# Patient Record
Sex: MALE | Race: WHITE | NOT HISPANIC OR LATINO | Employment: FULL TIME | URBAN - METROPOLITAN AREA
[De-identification: names, ages, dates, MRNs, and addresses within clinical notes are randomized per-mention and may not be internally consistent; named-entity substitution may affect disease eponyms.]

---

## 2019-09-26 ENCOUNTER — OFFICE VISIT (OUTPATIENT)
Dept: SLEEP CENTER | Facility: CLINIC | Age: 49
End: 2019-09-26
Payer: COMMERCIAL

## 2019-09-26 VITALS
HEIGHT: 73 IN | DIASTOLIC BLOOD PRESSURE: 78 MMHG | SYSTOLIC BLOOD PRESSURE: 122 MMHG | WEIGHT: 237 LBS | BODY MASS INDEX: 31.41 KG/M2 | HEART RATE: 72 BPM

## 2019-09-26 DIAGNOSIS — G47.33 OSA (OBSTRUCTIVE SLEEP APNEA): Primary | ICD-10-CM

## 2019-09-26 PROCEDURE — 99204 OFFICE O/P NEW MOD 45 MIN: CPT | Performed by: PSYCHIATRY & NEUROLOGY

## 2019-09-26 RX ORDER — DIPHENOXYLATE HYDROCHLORIDE AND ATROPINE SULFATE 2.5; .025 MG/1; MG/1
1 TABLET ORAL DAILY
COMMUNITY

## 2019-09-26 NOTE — LETTER
September 26, 2019     Eleuterio Woods MD  [address] [place] [id number]    Patient: Chino Baxter   YOB: 1970   Date of Visit: 9/26/2019       Dear Dr Anguiano Rolling:    Thank you for referring Chino Baxter to me for evaluation  Below are my notes for this consultation  If you have questions, please do not hesitate to call me  I look forward to following your patient along with you  Sincerely,        Kami Polanco MD        CC: No Recipients  Kami Polanco MD  9/26/2019 10:15 AM  Sign at close encounter  Sleep Medicine Consultation Note    HPI:  Mr Chino Baxter is a 47 y o  male seen at the request of Eleuterio MILLAN for advice regarding suspected sleep disordered breathing  The patient stated that he was diagnosed in 2000 in [place]  He was started on CPAP and eventually got onto BiPAP  He has been on BiPAP 17/14cm most of the time  He moved to this area 1 year ago and is looking for new supplies  His machine is 33 years old  He uses a nasal mask  His DME was [employer] then it was changed to [employer] [facility] [place]  He is unsure if they are still in business as he cannot get a hold of them  He feels that the BiPAP is working, to an extent  He cannot sleep without it  He wakes up twice at night to void  He has no issue falling asleep  He denied snoring with the BiPAP  He denied nocturnal gasping and witnessed apneas  The mask will wake him when the seal is not great  During the day, he is usually okay until 3pm  He is able to get up ready for the day  He does not usually nap  He denied involuntary dozing  He has had sleepiness while driving and usually fights it until it becomes too much and then pulls over to nap for 15 mins  He denied any close calls or sleepiness       Please see below for continuation of the HPI:      Sleep Disordered Breathing:  -Snoring: yes   -Severity: moderate   -Frequency: every night in the past  Not currently   -Duration: a few years   -Over time: improved and resolved with PAP   -Modifying factors: PAP helps  -Observed Apneas: in the past  -Mouth Breathing at night: no  -Dry Mouth in morning: no   -Nocturnal Gasping: no  -Nasal Obstruction: no  -Weight: he has lost 40 lbs 2015  270lbs at diagnosis  Sleep Pattern:  -Location: bedroom  -Bed/Recliner/Wedge: bed  -Bed Partner: wife  -HOB: flat  -# of pillows under head: 1  -Position: back and side  -Bedtime: 10pm  -Lights out: same time  -Environmental: No lights/TV  -Latency: mins  -Awakenings: 2+   -Reason: void   -Duration: mins  -Wake time: 5am   -Alarm: no  -Rise time: same time  -Days off: 10-6am  -Shift Work: M-F days  -Patient's estimate of total sleep time: 6-7h    Daytime Symptoms:  -Upon Awakening: rested  -Daytime fatigue/sleepiness: tired at 3pm  -Naps: no  -Involuntary Dozing: no  -Cognitive Symptoms: after 3pm cannot focus  -Driving: Difficulty with sleepiness and driving:  See HPI   -- Close calls related to sleepiness: no   -- Accidents related to sleepiness: no      Questionnaires:   Sitting and reading: High chance of dozing  Watching TV: High chance of dozing  Sitting, inactive in a public place (e g  a theatre or a meeting): Slight chance of dozing  As a passenger in a car for an hour without a break:  Moderate chance of dozing  Lying down to rest in the afternoon when circumstances permit: High chance of dozing  Sitting and talking to someone: Would never doze  Sitting quietly after a lunch without alcohol: Would never doze  In a car, while stopped for a few minutes in traffic: Would never doze  Total score: 12      Sleep Review of Symptoms:  -Parasomnias:  --Sleep Walking: no  --Dream Enactment: no  --Bruxism: no  -Motor:  --RLS: no  --PLMS: no  -Narcolepsy:  --Hallucinations: no  --Paralysis: no  --Cataplexy: no    Childhood Sleep History: denied    Prior Sleep Studies/Evaluations:  See HPI    Family History:  Family history of sleep disorders: denied    There is no problem list on file for this patient  Past Medical History:   Diagnosis Date    AYLA treated with BiPAP        --> Denies any cardiopulmonary disease  --> Seizure hx: denies  --> Head injury with LOC: denies  --> Supplemental Oxygen Use: denies    Labs     No results found for: WBC, RBC, HGB, HCT, MCV, MCH, MCHC, RDWCV    Past Surgical History:   Procedure Laterality Date    VASECTOMY         --> ENT procedures: denies    Current Outpatient Medications   Medication Sig Dispense Refill    multivitamin (THERAGRAN) TABS Take 1 tablet by mouth daily       No current facility-administered medications for this visit  Social History:  -Employment: [employer]   -Smoking: quit 8 years ago was smoking 1PPD  -Caffeine: 16oz of coffee a day  -Alcohol: social  -THC: no  -OTC/Supplements/herbals: no  -Illicits:  denies  -Family: lives with wife    ROS:  Genitourinary need to urinate more than twice a night   Cardiology none   Gastrointestinal none   Neurology none   Constitutional none   Integumentary none   Psychiatry none   Musculoskeletal legs twitching/jerking   Pulmonary none   ENT none   Endocrine frequent urination   Hematological none       MSE:  -Alert and appropriate: alert, calm, cooperative  -Oriented to person, place and time:  name, age, location, day/date/mon/yr  -Behavior: good, sustained eye contact  -Speech: Unremarkable rate/rhythm/volume  -Mood: "outstanding"  -Affect: full range  -Thought Processes: linear, logical, goal directed  PE:  Body mass index is 31 27 kg/m²    Vitals:    09/26/19 0800   BP: 122/78   Pulse: 72   Weight: 108 kg (237 lb)   Height: 6' 1" (1 854 m)       -General:  In NAD    -Eyes: Conjunctival injection: no     -EOM:  PERRLA, EOMI   -Eyelid hooding: no    -ENT: MP: 4/4   -Facial deformity: no retrognathia   -Hard palate: moderate arch   -Soft palate:  Crowding with elongated uvula   -Gums and teeth: normal dentition   -Tongue:  Scalloping   -Nares: Patent    -Neck/Lymphatics: Lymphadenopathy:  none appreciated   -Masses:  none appreciated   -Circumference: Neck Circumference: 16 5 "    -Cardiac: Auscultation:  RRR   - LE edema over shins: none appreciated    -Pulm: -Respirations: unlaboured         -Auscultation:  CTA bilaterally, posterior fields    -Neuro: No resting tremor     -Musculoskeletal: Gait and stance: normal turning and ambulation; unremarkable  Assessment:  Mr Damian Lai is a 47 y o  male who is seen to evaluate for treatment of AYLA  The patient has been treated for many years now with BiPAP and has not been able to get new supplies since moving to this area from [place]  He has a relatively new BiPAP that is 33 years old and works well  He has lost weight since 2015 but has not had a pressure change  He has mask leak at times but otherwise no other trouble with it  He does have trouble with daytime sleepiness after 3pm  He feels it is working well for him but used to work better  He may need a pressure adjustment  We do not have the previous sleep studies available at this time  The pathophysiology of, the reasons to treat and treatment options for obstructive sleep apnea were all reviewed with the patient today  Discussed the need for either split study if no studies available or BiPAP titration if we are able to obtain the previous study  He is amenable to this  Discussed keeping nasal passages clear, abstaining from alcohol, and other sedating drugs at night- which will worsen symptoms of AYLA  --History provided by: patient   --Records reviewed: in chart      Recommendations:  1) split night study to BiPAP  2) Driving safety was reviewed with patient  If the patient feels too sleepy to drive he knows not to drive  If he becomes sleepy while driving he will pull over and nap  3) Follow-up after initiation of treatment  4) SEAN for previous studies  5) Call with any questions or concerns       All questions answered for the patient, who indicated understanding and agreed with the plan       Kirsty Frazier MD  Psychiatry/ Sleep medicine

## 2019-09-26 NOTE — PATIENT INSTRUCTIONS
Recommendations:  1) split night study  2) Driving safety was reviewed with patient  If the patient feels too sleepy to drive he knows not to drive  If he becomes sleepy while driving he will pull over and nap  3) Follow-up after initiation of treatment  4) SEAN for previous studies  5) Call with any questions or concerns

## 2019-09-26 NOTE — PROGRESS NOTES
Sleep Medicine Consultation Note    HPI:  Mr Debbie White is a 47 y o  male seen at the request of Juliane MILLAN for advice regarding suspected sleep disordered breathing  The patient stated that he was diagnosed in 2000 in [place]  He was started on CPAP and eventually got onto BiPAP  He has been on BiPAP 17/14cm most of the time  He moved to this area 1 year ago and is looking for new supplies  His machine is 33 years old  He uses a nasal mask  His DME was [employer] then it was changed to [employer] [place]  He is unsure if they are still in business as he cannot get a hold of them  He feels that the BiPAP is working, to an extent  He cannot sleep without it  He wakes up twice at night to void  He has no issue falling asleep  He denied snoring with the BiPAP  He denied nocturnal gasping and witnessed apneas  The mask will wake him when the seal is not great  During the day, he is usually okay until 3pm  He is able to get up ready for the day  He does not usually nap  He denied involuntary dozing  He has had sleepiness while driving and usually fights it until it becomes too much and then pulls over to nap for 15 mins  He denied any close calls or sleepiness  Please see below for continuation of the HPI:      Sleep Disordered Breathing:  -Snoring: yes   -Severity: moderate   -Frequency: every night in the past  Not currently   -Duration: a few years   -Over time: improved and resolved with PAP   -Modifying factors: PAP helps  -Observed Apneas: in the past  -Mouth Breathing at night: no  -Dry Mouth in morning: no   -Nocturnal Gasping: no  -Nasal Obstruction: no  -Weight: he has lost 40 lbs 2015  270lbs at diagnosis       Sleep Pattern:  -Location: bedroom  -Bed/Recliner/Wedge: bed  -Bed Partner: wife  -HOB: flat  -# of pillows under head: 1  -Position: back and side  -Bedtime: 10pm  -Lights out: same time  -Environmental: No lights/TV  -Latency: mins  -Awakenings: 2+   -Reason: void   -Duration: mins  -Wake time: 5am   -Alarm: no  -Rise time: same time  -Days off: 10-6am  -Shift Work: M-F days  -Patient's estimate of total sleep time: 6-7h    Daytime Symptoms:  -Upon Awakening: rested  -Daytime fatigue/sleepiness: tired at 3pm  -Naps: no  -Involuntary Dozing: no  -Cognitive Symptoms: after 3pm cannot focus  -Driving: Difficulty with sleepiness and driving:  See HPI   -- Close calls related to sleepiness: no   -- Accidents related to sleepiness: no      Questionnaires:   Sitting and reading: High chance of dozing  Watching TV: High chance of dozing  Sitting, inactive in a public place (e g  a theatre or a meeting): Slight chance of dozing  As a passenger in a car for an hour without a break: Moderate chance of dozing  Lying down to rest in the afternoon when circumstances permit: High chance of dozing  Sitting and talking to someone: Would never doze  Sitting quietly after a lunch without alcohol: Would never doze  In a car, while stopped for a few minutes in traffic: Would never doze  Total score: 12      Sleep Review of Symptoms:  -Parasomnias:  --Sleep Walking: no  --Dream Enactment: no  --Bruxism: no  -Motor:  --RLS: no  --PLMS: no  -Narcolepsy:  --Hallucinations: no  --Paralysis: no  --Cataplexy: no    Childhood Sleep History: denied    Prior Sleep Studies/Evaluations:  See HPI    Family History:  Family history of sleep disorders: denied    There is no problem list on file for this patient      Past Medical History:   Diagnosis Date    AYLA treated with BiPAP        --> Denies any cardiopulmonary disease  --> Seizure hx: denies  --> Head injury with LOC: denies  --> Supplemental Oxygen Use: denies    Labs     No results found for: WBC, RBC, HGB, HCT, MCV, MCH, MCHC, RDWCV    Past Surgical History:   Procedure Laterality Date    VASECTOMY         --> ENT procedures: denies    Current Outpatient Medications   Medication Sig Dispense Refill    multivitamin (THERAGRAN) TABS Take 1 tablet by mouth daily       No current facility-administered medications for this visit  Social History:  -Employment: [employer]   -Smoking: quit 8 years ago was smoking 1PPD  -Caffeine: 16oz of coffee a day  -Alcohol: social  -THC: no  -OTC/Supplements/herbals: no  -Illicits:  denies  -Family: lives with wife    ROS:  Genitourinary need to urinate more than twice a night   Cardiology none   Gastrointestinal none   Neurology none   Constitutional none   Integumentary none   Psychiatry none   Musculoskeletal legs twitching/jerking   Pulmonary none   ENT none   Endocrine frequent urination   Hematological none       MSE:  -Alert and appropriate: alert, calm, cooperative  -Oriented to person, place and time:  name, age, location, day/date/mon/yr  -Behavior: good, sustained eye contact  -Speech: Unremarkable rate/rhythm/volume  -Mood: "outstanding"  -Affect: full range  -Thought Processes: linear, logical, goal directed  PE:  Body mass index is 31 27 kg/m²  Vitals:    09/26/19 0800   BP: 122/78   Pulse: 72   Weight: 108 kg (237 lb)   Height: 6' 1" (1 854 m)       -General:  In NAD    -Eyes: Conjunctival injection: no     -EOM:  PERRLA, EOMI   -Eyelid hooding: no    -ENT: MP: 4/4   -Facial deformity: no retrognathia   -Hard palate: moderate arch   -Soft palate:  Crowding with elongated uvula   -Gums and teeth: normal dentition   -Tongue:  Scalloping   -Nares:  Patent    -Neck/Lymphatics: Lymphadenopathy:  none appreciated   -Masses:  none appreciated   -Circumference: Neck Circumference: 16 5 "    -Cardiac: Auscultation:  RRR   - LE edema over shins: none appreciated    -Pulm: -Respirations: unlaboured         -Auscultation:  CTA bilaterally, posterior fields    -Neuro: No resting tremor     -Musculoskeletal: Gait and stance: normal turning and ambulation; unremarkable  Assessment:  Mr Ary Louie is a 47 y o  male who is seen to evaluate for treatment of AYLA   The patient has been treated for many years now with BiPAP and has not been able to get new supplies since moving to this area from [place]  He has a relatively new BiPAP that is 33 years old and works well  He has lost weight since 2015 but has not had a pressure change  He has mask leak at times but otherwise no other trouble with it  He does have trouble with daytime sleepiness after 3pm  He feels it is working well for him but used to work better  He may need a pressure adjustment  We do not have the previous sleep studies available at this time  The pathophysiology of, the reasons to treat and treatment options for obstructive sleep apnea were all reviewed with the patient today  Discussed the need for either split study if no studies available or BiPAP titration if we are able to obtain the previous study  He is amenable to this  Discussed keeping nasal passages clear, abstaining from alcohol, and other sedating drugs at night- which will worsen symptoms of AYLA  --History provided by: patient   --Records reviewed: in chart      Recommendations:  1) split night study to BiPAP  2) Driving safety was reviewed with patient  If the patient feels too sleepy to drive he knows not to drive  If he becomes sleepy while driving he will pull over and nap  3) Follow-up after initiation of treatment  4) SEAN for previous studies  5) Call with any questions or concerns  All questions answered for the patient, who indicated understanding and agreed with the plan       Martin Angel MD  Psychiatry/ Sleep medicine

## 2019-09-27 ENCOUNTER — TELEPHONE (OUTPATIENT)
Dept: SLEEP CENTER | Facility: CLINIC | Age: 49
End: 2019-09-27

## 2019-09-27 NOTE — TELEPHONE ENCOUNTER
----- Message from Rahul Gooden sent at 9/26/2019 10:18 AM EDT -----  Regarding: script for supplies  Contact: [phone]  Patient was here today to see you, he forgot to get a script for supplies, he will us any company in this area    Was using [facility] in [place]

## 2019-10-01 ENCOUNTER — TELEPHONE (OUTPATIENT)
Dept: SLEEP CENTER | Facility: CLINIC | Age: 49
End: 2019-10-01

## 2019-11-23 ENCOUNTER — APPOINTMENT (EMERGENCY)
Dept: CT IMAGING | Facility: HOSPITAL | Age: 49
DRG: 872 | End: 2019-11-23
Payer: COMMERCIAL

## 2019-11-23 ENCOUNTER — HOSPITAL ENCOUNTER (INPATIENT)
Facility: HOSPITAL | Age: 49
LOS: 5 days | Discharge: HOME | DRG: 872 | End: 2019-11-28
Attending: EMERGENCY MEDICINE | Admitting: INTERNAL MEDICINE
Payer: COMMERCIAL

## 2019-11-23 ENCOUNTER — APPOINTMENT (EMERGENCY)
Dept: ULTRASOUND IMAGING | Facility: HOSPITAL | Age: 49
DRG: 872 | End: 2019-11-23
Payer: COMMERCIAL

## 2019-11-23 DIAGNOSIS — E11.65 TYPE 2 DIABETES MELLITUS WITH HYPERGLYCEMIA, WITHOUT LONG-TERM CURRENT USE OF INSULIN (HCC): ICD-10-CM

## 2019-11-23 DIAGNOSIS — A41.9 SEVERE SEPSIS (HCC): ICD-10-CM

## 2019-11-23 DIAGNOSIS — L03.314 CELLULITIS OF GROIN: Primary | ICD-10-CM

## 2019-11-23 DIAGNOSIS — R73.9 HYPERGLYCEMIA: ICD-10-CM

## 2019-11-23 DIAGNOSIS — R65.20 SEVERE SEPSIS (HCC): ICD-10-CM

## 2019-11-23 DIAGNOSIS — K52.9 GASTROENTERITIS: ICD-10-CM

## 2019-11-23 PROBLEM — K92.1 MELENA: Status: ACTIVE | Noted: 2019-11-23

## 2019-11-23 PROBLEM — R19.7 DIARRHEA: Status: ACTIVE | Noted: 2019-11-23

## 2019-11-23 PROBLEM — R74.01 TRANSAMINITIS: Status: ACTIVE | Noted: 2019-11-23

## 2019-11-23 PROBLEM — G47.33 OSA (OBSTRUCTIVE SLEEP APNEA): Status: ACTIVE | Noted: 2019-11-23

## 2019-11-23 LAB
ALBUMIN SERPL BCP-MCNC: 4.1 G/DL (ref 3.5–5)
ALP SERPL-CCNC: 86 U/L (ref 46–116)
ALT SERPL W P-5'-P-CCNC: 108 U/L (ref 12–78)
ANION GAP SERPL CALCULATED.3IONS-SCNC: 14 MMOL/L (ref 4–13)
APTT PPP: 28 SECONDS (ref 23–37)
AST SERPL W P-5'-P-CCNC: 49 U/L (ref 5–45)
BACTERIA UR QL AUTO: ABNORMAL /HPF
BASOPHILS # BLD AUTO: 0.03 THOUSANDS/ΜL (ref 0–0.1)
BASOPHILS NFR BLD AUTO: 0 % (ref 0–1)
BILIRUB SERPL-MCNC: 0.8 MG/DL (ref 0.2–1)
BILIRUB UR QL STRIP: NEGATIVE
BUN SERPL-MCNC: 13 MG/DL (ref 5–25)
CALCIUM SERPL-MCNC: 9.1 MG/DL (ref 8.3–10.1)
CHLORIDE SERPL-SCNC: 98 MMOL/L (ref 100–108)
CLARITY UR: CLEAR
CO2 SERPL-SCNC: 24 MMOL/L (ref 21–32)
COLOR UR: YELLOW
CREAT SERPL-MCNC: 1.06 MG/DL (ref 0.6–1.3)
EOSINOPHIL # BLD AUTO: 0.03 THOUSAND/ΜL (ref 0–0.61)
EOSINOPHIL NFR BLD AUTO: 0 % (ref 0–6)
ERYTHROCYTE [DISTWIDTH] IN BLOOD BY AUTOMATED COUNT: 12.4 % (ref 11.6–15.1)
EST. AVERAGE GLUCOSE BLD GHB EST-MCNC: 295 MG/DL
GFR SERPL CREATININE-BSD FRML MDRD: 81 ML/MIN/1.73SQ M
GLUCOSE SERPL-MCNC: 312 MG/DL (ref 65–140)
GLUCOSE SERPL-MCNC: 333 MG/DL (ref 65–140)
GLUCOSE UR STRIP-MCNC: ABNORMAL MG/DL
HBA1C MFR BLD: 11.9 % (ref 4.2–6.3)
HCT VFR BLD AUTO: 47.1 % (ref 36.5–49.3)
HGB BLD-MCNC: 16.6 G/DL (ref 12–17)
HGB UR QL STRIP.AUTO: NEGATIVE
IMM GRANULOCYTES # BLD AUTO: 0.08 THOUSAND/UL (ref 0–0.2)
IMM GRANULOCYTES NFR BLD AUTO: 0 % (ref 0–2)
INR PPP: 1.01 (ref 0.84–1.19)
KETONES UR STRIP-MCNC: NEGATIVE MG/DL
LACTATE SERPL-SCNC: 2 MMOL/L (ref 0.5–2)
LACTATE SERPL-SCNC: 3.1 MMOL/L (ref 0.5–2)
LEUKOCYTE ESTERASE UR QL STRIP: ABNORMAL
LIPASE SERPL-CCNC: 326 U/L (ref 73–393)
LYMPHOCYTES # BLD AUTO: 0.97 THOUSANDS/ΜL (ref 0.6–4.47)
LYMPHOCYTES NFR BLD AUTO: 5 % (ref 14–44)
MCH RBC QN AUTO: 31.1 PG (ref 26.8–34.3)
MCHC RBC AUTO-ENTMCNC: 35.2 G/DL (ref 31.4–37.4)
MCV RBC AUTO: 88 FL (ref 82–98)
MONOCYTES # BLD AUTO: 1.19 THOUSAND/ΜL (ref 0.17–1.22)
MONOCYTES NFR BLD AUTO: 6 % (ref 4–12)
NEUTROPHILS # BLD AUTO: 17.85 THOUSANDS/ΜL (ref 1.85–7.62)
NEUTS SEG NFR BLD AUTO: 89 % (ref 43–75)
NITRITE UR QL STRIP: NEGATIVE
NON-SQ EPI CELLS URNS QL MICRO: ABNORMAL /HPF
NRBC BLD AUTO-RTO: 0 /100 WBCS
PH UR STRIP.AUTO: 5 [PH]
PLATELET # BLD AUTO: 200 THOUSANDS/UL (ref 149–390)
PLATELET # BLD AUTO: 204 THOUSANDS/UL (ref 149–390)
PMV BLD AUTO: 9.7 FL (ref 8.9–12.7)
PMV BLD AUTO: 9.8 FL (ref 8.9–12.7)
POTASSIUM SERPL-SCNC: 3.8 MMOL/L (ref 3.5–5.3)
PROT SERPL-MCNC: 7.2 G/DL (ref 6.4–8.2)
PROT UR STRIP-MCNC: NEGATIVE MG/DL
PROTHROMBIN TIME: 13.3 SECONDS (ref 11.6–14.5)
RBC # BLD AUTO: 5.34 MILLION/UL (ref 3.88–5.62)
RBC #/AREA URNS AUTO: ABNORMAL /HPF
SODIUM SERPL-SCNC: 136 MMOL/L (ref 136–145)
SP GR UR STRIP.AUTO: 1.02 (ref 1–1.03)
UROBILINOGEN UR QL STRIP.AUTO: 0.2 E.U./DL
WBC # BLD AUTO: 20.15 THOUSAND/UL (ref 4.31–10.16)
WBC #/AREA URNS AUTO: ABNORMAL /HPF

## 2019-11-23 PROCEDURE — 83605 ASSAY OF LACTIC ACID: CPT | Performed by: PHYSICIAN ASSISTANT

## 2019-11-23 PROCEDURE — 85025 COMPLETE CBC W/AUTO DIFF WBC: CPT | Performed by: PHYSICIAN ASSISTANT

## 2019-11-23 PROCEDURE — 82948 REAGENT STRIP/BLOOD GLUCOSE: CPT

## 2019-11-23 PROCEDURE — 83036 HEMOGLOBIN GLYCOSYLATED A1C: CPT | Performed by: PHYSICIAN ASSISTANT

## 2019-11-23 PROCEDURE — 76870 US EXAM SCROTUM: CPT

## 2019-11-23 PROCEDURE — 94760 N-INVAS EAR/PLS OXIMETRY 1: CPT

## 2019-11-23 PROCEDURE — 94660 CPAP INITIATION&MGMT: CPT

## 2019-11-23 PROCEDURE — 82272 OCCULT BLD FECES 1-3 TESTS: CPT

## 2019-11-23 PROCEDURE — C9113 INJ PANTOPRAZOLE SODIUM, VIA: HCPCS | Performed by: PHYSICIAN ASSISTANT

## 2019-11-23 PROCEDURE — 74177 CT ABD & PELVIS W/CONTRAST: CPT

## 2019-11-23 PROCEDURE — 83690 ASSAY OF LIPASE: CPT | Performed by: PHYSICIAN ASSISTANT

## 2019-11-23 PROCEDURE — 99285 EMERGENCY DEPT VISIT HI MDM: CPT | Performed by: PHYSICIAN ASSISTANT

## 2019-11-23 PROCEDURE — 99222 1ST HOSP IP/OBS MODERATE 55: CPT | Performed by: INTERNAL MEDICINE

## 2019-11-23 PROCEDURE — 87040 BLOOD CULTURE FOR BACTERIA: CPT | Performed by: PHYSICIAN ASSISTANT

## 2019-11-23 PROCEDURE — 96361 HYDRATE IV INFUSION ADD-ON: CPT

## 2019-11-23 PROCEDURE — 99285 EMERGENCY DEPT VISIT HI MDM: CPT

## 2019-11-23 PROCEDURE — 80053 COMPREHEN METABOLIC PANEL: CPT | Performed by: PHYSICIAN ASSISTANT

## 2019-11-23 PROCEDURE — 36415 COLL VENOUS BLD VENIPUNCTURE: CPT | Performed by: PHYSICIAN ASSISTANT

## 2019-11-23 PROCEDURE — 96365 THER/PROPH/DIAG IV INF INIT: CPT

## 2019-11-23 PROCEDURE — 90471 IMMUNIZATION ADMIN: CPT | Performed by: PHYSICIAN ASSISTANT

## 2019-11-23 PROCEDURE — 96375 TX/PRO/DX INJ NEW DRUG ADDON: CPT

## 2019-11-23 PROCEDURE — 85610 PROTHROMBIN TIME: CPT | Performed by: PHYSICIAN ASSISTANT

## 2019-11-23 PROCEDURE — 85049 AUTOMATED PLATELET COUNT: CPT | Performed by: PHYSICIAN ASSISTANT

## 2019-11-23 PROCEDURE — 90682 RIV4 VACC RECOMBINANT DNA IM: CPT | Performed by: PHYSICIAN ASSISTANT

## 2019-11-23 PROCEDURE — 85730 THROMBOPLASTIN TIME PARTIAL: CPT | Performed by: PHYSICIAN ASSISTANT

## 2019-11-23 PROCEDURE — 81001 URINALYSIS AUTO W/SCOPE: CPT | Performed by: PHYSICIAN ASSISTANT

## 2019-11-23 RX ORDER — AMOXICILLIN AND CLAVULANATE POTASSIUM 875; 125 MG/1; MG/1
1 TABLET, FILM COATED ORAL EVERY 12 HOURS SCHEDULED
Status: ON HOLD | COMMUNITY
End: 2019-11-23 | Stop reason: CLARIF

## 2019-11-23 RX ORDER — SODIUM CHLORIDE 9 MG/ML
125 INJECTION, SOLUTION INTRAVENOUS CONTINUOUS
Status: DISCONTINUED | OUTPATIENT
Start: 2019-11-23 | End: 2019-11-27

## 2019-11-23 RX ORDER — ACETAMINOPHEN 325 MG/1
650 TABLET ORAL EVERY 6 HOURS PRN
Status: DISCONTINUED | OUTPATIENT
Start: 2019-11-23 | End: 2019-11-28 | Stop reason: HOSPADM

## 2019-11-23 RX ORDER — KETOROLAC TROMETHAMINE 30 MG/ML
15 INJECTION, SOLUTION INTRAMUSCULAR; INTRAVENOUS ONCE
Status: COMPLETED | OUTPATIENT
Start: 2019-11-23 | End: 2019-11-23

## 2019-11-23 RX ORDER — PANTOPRAZOLE SODIUM 40 MG/1
40 INJECTION, POWDER, FOR SOLUTION INTRAVENOUS EVERY 12 HOURS SCHEDULED
Status: DISCONTINUED | OUTPATIENT
Start: 2019-11-23 | End: 2019-11-24

## 2019-11-23 RX ADMIN — SODIUM CHLORIDE 125 ML/HR: 0.9 INJECTION, SOLUTION INTRAVENOUS at 17:33

## 2019-11-23 RX ADMIN — VANCOMYCIN HYDROCHLORIDE 1750 MG: 1 INJECTION, POWDER, LYOPHILIZED, FOR SOLUTION INTRAVENOUS at 18:41

## 2019-11-23 RX ADMIN — INFLUENZA A VIRUS A/BRISBANE/02/2018 (H1N1) RECOMBINANT HEMAGGLUTININ ANTIGEN, INFLUENZA A VIRUS A/KANSAS/14/2017 (H3N2) RECOMBINANT HEMAGGLUTININ ANTIGEN, INFLUENZA B VIRUS B/PHUKET/3073/2013 RECOMBINANT HEMAGGLUTININ ANTIGEN, AND INFLUENZA B VIRUS B/MARYLAND/15/2016 RECOMBINANT HEMAGGLUTININ ANTIGEN 0.5 ML: 45; 45; 45; 45 INJECTION INTRAMUSCULAR at 18:08

## 2019-11-23 RX ADMIN — SODIUM CHLORIDE 1000 ML: 0.9 INJECTION, SOLUTION INTRAVENOUS at 13:40

## 2019-11-23 RX ADMIN — CEFTRIAXONE SODIUM 1000 MG: 10 INJECTION, POWDER, FOR SOLUTION INTRAVENOUS at 15:13

## 2019-11-23 RX ADMIN — SODIUM CHLORIDE 1000 ML: 0.9 INJECTION, SOLUTION INTRAVENOUS at 14:45

## 2019-11-23 RX ADMIN — PIPERACILLIN SODIUM,TAZOBACTAM SODIUM 3.38 G: 3; .375 INJECTION, POWDER, FOR SOLUTION INTRAVENOUS at 17:50

## 2019-11-23 RX ADMIN — PANTOPRAZOLE SODIUM 40 MG: 40 INJECTION, POWDER, FOR SOLUTION INTRAVENOUS at 21:10

## 2019-11-23 RX ADMIN — KETOROLAC TROMETHAMINE 15 MG: 30 INJECTION, SOLUTION INTRAMUSCULAR at 13:51

## 2019-11-23 RX ADMIN — IOHEXOL 100 ML: 350 INJECTION, SOLUTION INTRAVENOUS at 14:56

## 2019-11-23 RX ADMIN — INSULIN LISPRO 3 UNITS: 100 INJECTION, SOLUTION INTRAVENOUS; SUBCUTANEOUS at 21:26

## 2019-11-23 NOTE — ASSESSMENT & PLAN NOTE
· Mild, no previous blood work to compare  · AST 49,    · Could be secondary to recent gastroenteritis   · Repeat CMP in the AM

## 2019-11-23 NOTE — ASSESSMENT & PLAN NOTE
· X 24 hours, pt reports edema and warmth, denies any significant pain   · Scrotal US with scrotal wall thickening, edema and inflammation, testes appear normal  · Pt reports recurrent episodes, previously treated with amoxicillin outpatient with resolution   · Urology consultation  · IV Zosyn and vancomycin   · If no significant improvement would consider ID consultation

## 2019-11-23 NOTE — PROGRESS NOTES
Vancomycin Assessment    Erika Kan is a 47 y o  male who is currently receiving vancomycin 1500 mg q12h for       Relevant clinical data and objective history reviewed:  Creatinine   Date Value Ref Range Status   11/23/2019 1 06 0 60 - 1 30 mg/dL Final     Comment:     Standardized to IDMS reference method     /76   Pulse (!) 113   Temp 99 °F (37 2 °C)   Resp 18   Ht 6' 1" (1 854 m)   Wt 106 kg (233 lb 11 oz)   SpO2 96%   BMI 30 83 kg/m²   No intake/output data recorded  Lab Results   Component Value Date/Time    BUN 13 11/23/2019 01:50 PM    WBC 20 15 (H) 11/23/2019 01:50 PM    HGB 16 6 11/23/2019 01:50 PM    HCT 47 1 11/23/2019 01:50 PM    MCV 88 11/23/2019 01:50 PM     11/23/2019 01:50 PM     Temp Readings from Last 3 Encounters:   11/23/19 99 °F (37 2 °C)     Vancomycin Days of Therapy: 1    Assessment/Plan  The patient is currently on vancomycin utilizing scheduled dosing based on adjusted body weight (due to obesity)  Baseline risks associated with therapy include: none   The patient is currently receiving 1500 mg q12h and after clinical evaluation will be changed to 1750 mg q12h  Pharmacy will also follow closely for s/sx of nephrotoxicity, infusion reactions and appropriateness of therapy  BMP and CBC will be ordered per protocol  Plan for trough as patient approaches steady state, prior to the 4th  dose at approximately 11/  Due to infection severity, will target a trough of 15-20 (appropriate for most indications)   Pharmacy will continue to follow the patients culture results and clinical progress daily      Raven Nolasco, Pharmacist

## 2019-11-23 NOTE — ED NOTES
1  CC: abdominal pain, chills, groin/scrotum area  Dx: cellulitis & sepsis    2  Orientation status: alert and oriented x4    3  Abnormal labs/abnormal focused assessment/abnormal vitals: first lactic 3 1 second lactic 2 0, glucose > 300(did not eat, not diagnosed w diabetes but reports it has been suspected)  WBC >20    4  Medications/ drips: torodol, 2 Liters of fluid, Rocephin    5  Last time narcotics were given: NA    6  IV lines/drains/etc    20 Left AC   7  Isolation status:     none    8  Skin:       RED/swollen entire pubic area (came on all at once this morning)    9  Ambulation status:  independent      10   ED nurse's phone number: [address], RN  11/23/19 [facility]  11/23/19 [id number]

## 2019-11-23 NOTE — ASSESSMENT & PLAN NOTE
· Glucose 312 on admission  · Pt denies history of DM in the past, reports following up with PCP/urology on fairly regular basis  · Obtain hgbA1c, likely uncontrolled DM in setting of recurrent infections  · Place on consistent carb diet  · QID glucose checks  · Low dose SSI coverage pending hgbA1c  · Pt also reports possible neuropathy, pending A1c could trial gabapentin

## 2019-11-23 NOTE — ASSESSMENT & PLAN NOTE
· POA, as evidenced by tachycardia, leukocytosis, lactic acidosis and scrotal cellulitis on US  · Pt reports history of scrotal cellulitis as outpatient, previously treated with amoxicillin with resolution   · Place on aggressive IVF hydration   · IV Zosyn and vancomycin for anaerobic coverage  · Urology consultation  · HgbA1c, possible underlying DM causing recurrent infections  · Blood cultures pending  · UA negative   · Supportive care with PRN tylenol, pt denies any severe pain currently   · Continue to trend temps/WBC closely

## 2019-11-23 NOTE — ED NOTES
Code alarm canceled, ultrasound tech hit it on remote by accident  Alysha Diane, RN  11/23/19 [id number] [address] [place] [address], RN  11/23/19 1775

## 2019-11-23 NOTE — ED PROVIDER NOTES
History  Chief Complaint   Patient presents with    Diarrhea     Patient c/o diarrhea that started on Tuesday and continued into Wednesday which then subsided  Patient states today he had sudden onset of shivering that started at home today  Patient is also c/o abdominal tenderness and pelvic tenderness  Patient states he recently traveled to [facility] and got home last Saturday  51yo male with a history of sleep apnea presenting for evaluation of multiple complaints including diarrhea, chills, and groin pain  Patient recently returned from a vacation in [facility] about 1 week ago  Patient starting having diarrhea about 2 days after returning  His symptoms were initially improving, however he began feeling unwell today  Patient had 2 episodes of melanotic stools with bright red blood mixed in  Patient also noted that his bilateral testicles were red and swollen  Patient also had shaking chills this morning and felt like he had a fever  Patient states he has a recurrent issue with testicular swelling  He has been seen by Urology multiple times for this  Patient was told to take amoxicillin when he notices testicular pain and swelling  Patient took one dose of amoxicillin prior to arrival  Patient denies sick contacts, nausea, vomiting, dysuria, hematuria, dizziness, chest pain, shortness of breath  History provided by:  Patient and spouse   used: No    Diarrhea   Quality:  Semi-solid and black and tarry  Severity:  Moderate  Onset quality:  Gradual  Number of episodes:  2 episodes today  Duration:  4 days  Timing:  Constant  Progression:  Improving  Relieved by:  Nothing  Worsened by:  Nothing  Ineffective treatments:  None tried  Associated symptoms: chills and myalgias    Associated symptoms: no abdominal pain, no fever, no URI and no vomiting    Risk factors: travel to endemic area        Prior to Admission Medications   Prescriptions Last Dose Informant Patient Reported? Taking? amoxicillin-clavulanate (AUGMENTIN) 875-125 mg per tablet 11/23/2019 at 1100  Yes Yes   Sig: Take 1 tablet by mouth every 12 (twelve) hours   multivitamin (THERAGRAN) TABS   Yes No   Sig: Take 1 tablet by mouth daily      Facility-Administered Medications: None       Past Medical History:   Diagnosis Date    AYLA treated with BiPAP        Past Surgical History:   Procedure Laterality Date    VASECTOMY         History reviewed  No pertinent family history  I have reviewed and agree with the history as documented  Social History     Tobacco Use    Smoking status: Current Some Day Smoker     Types: Cigars    Smokeless tobacco: Current User    Tobacco comment: occ   Substance Use Topics    Alcohol use: Yes     Comment: occ    Drug use: Not on file        Review of Systems   Constitutional: Positive for chills  Negative for fever  Respiratory: Negative for shortness of breath  Cardiovascular: Negative for chest pain  Gastrointestinal: Positive for diarrhea  Negative for abdominal pain and vomiting  Genitourinary: Positive for scrotal swelling and testicular pain  Negative for difficulty urinating and dysuria  Musculoskeletal: Positive for myalgias  Negative for back pain  Allergic/Immunologic: Negative for immunocompromised state  Neurological: Positive for weakness  Negative for syncope  Hematological: Does not bruise/bleed easily  Psychiatric/Behavioral: Negative for confusion  All other systems reviewed and are negative  Physical Exam  Physical Exam   Constitutional: He appears well-developed and well-nourished  No distress  HENT:   Head: Normocephalic and atraumatic  Right Ear: External ear normal    Left Ear: External ear normal    Mouth/Throat: Oropharynx is clear and moist    Eyes: Conjunctivae are normal  Right eye exhibits no discharge  Left eye exhibits no discharge  No scleral icterus  Neck: Normal range of motion  Neck supple     Cardiovascular: Normal rate, regular rhythm and normal heart sounds  No murmur heard  Pulmonary/Chest: Effort normal and breath sounds normal  No stridor  No respiratory distress  He has no wheezes  He has no rales  Abdominal: Soft  Bowel sounds are normal  He exhibits no distension  There is no tenderness  There is no guarding  No pain elicited on palpation   Genitourinary: Rectal exam shows guaiac positive stool  Right testis shows swelling and tenderness  Left testis shows swelling and tenderness  Uncircumcised  Genitourinary Comments: Well demarcated area of erythema present to groin region  Bilateral testes erythematous and swollen  Pain to minimal palpation  Normal rectal exam  No external hemorrhoids present  Melanotic stool with positive hemoccult  Musculoskeletal: Normal range of motion  He exhibits no deformity  Neurological: He is alert  He is not disoriented  GCS eye subscore is 4  GCS verbal subscore is 5  GCS motor subscore is 6  Skin: Skin is warm and dry  He is not diaphoretic  Psychiatric: He has a normal mood and affect  His behavior is normal    Nursing note and vitals reviewed        Vital Signs  ED Triage Vitals   Temperature Pulse Respirations Blood Pressure SpO2   11/23/19 1238 11/23/19 1238 11/23/19 1238 11/23/19 1238 11/23/19 1238   98 8 °F (37 1 °C) (!) 115 20 (!) 180/87 97 %      Temp Source Heart Rate Source Patient Position - Orthostatic VS BP Location FiO2 (%)   11/23/19 1238 11/23/19 1238 11/23/19 1238 11/23/19 1238 --   Oral Monitor Lying Right arm       Pain Score       11/23/19 1338       No Pain           Vitals:    11/23/19 1238 11/23/19 1338   BP: (!) 180/87 120/57   Pulse: (!) 115 (!) 120   Patient Position - Orthostatic VS: Lying          Visual Acuity      ED Medications  Medications   sodium chloride 0 9 % bolus 1,000 mL (0 mL Intravenous Stopped 11/23/19 1430)   ketorolac (TORADOL) injection 15 mg (15 mg Intravenous Given 11/23/19 1351)   sodium chloride 0 9 % bolus 1,000 mL (1,000 mL Intravenous New Bag 11/23/19 1445)   ceftriaxone (ROCEPHIN) 1 g/50 mL in dextrose IVPB (1,000 mg Intravenous New Bag 11/23/19 1513)   iohexol (OMNIPAQUE) 350 MG/ML injection (MULTI-DOSE) 100 mL (100 mL Intravenous Given 11/23/19 1456)       Diagnostic Studies  Results Reviewed     Procedure Component Value Units Date/Time    Lactic acid, plasma [[id number]] Collected:  11/23/19 1555    Lab Status:  No result Specimen:  Blood from Arm, Left     Blood culture #1 [[id number]] Collected:  11/23/19 1554    Lab Status:  No result Specimen:  Blood from Arm, Left     UA w Reflex to Microscopic w Reflex to Culture [[id number]]  (Abnormal) Collected:  11/23/19 1509    Lab Status:  Final result Specimen:  Urine, Clean Catch Updated:  11/23/19 1549     Color, UA Yellow     Clarity, UA Clear     Specific Gravity, UA 1 025     pH, UA 5 0     Leukocytes, UA Elevated glucose may cause decreased leukocyte values  See urine microscopic for [facility] result/     Nitrite, UA Negative     Protein, UA Negative mg/dl      Glucose, UA >=1000 (1%) mg/dl      Ketones, UA Negative mg/dl      Urobilinogen, UA 0 2 E U /dl      Bilirubin, UA Negative     Blood, UA Negative    Urine Microscopic [[id number]] Collected:  11/23/19 1509    Lab Status: In process Specimen:  Urine, Clean Catch Updated:  11/23/19 1549    Blood culture #2 [[id number]] Collected:  11/23/19 1502    Lab Status: In process Specimen:  Blood from Arm, Right Updated:  11/23/19 1519    Lactic acid, plasma [[id number]]  (Abnormal) Collected:  11/23/19 1350    Lab Status:  Final result Specimen:  Blood from Arm, Right Updated:  11/23/19 1437     LACTIC ACID 3 1 mmol/L     Narrative:       Result may be elevated if tourniquet was used during collection      Comprehensive metabolic panel [[id number]]  (Abnormal) Collected:  11/23/19 1350    Lab Status:  Final result Specimen:  Blood from Arm, Right Updated:  11/23/19 1423     Sodium 136 mmol/L      Potassium 3 8 mmol/L      Chloride 98 mmol/L CO2 24 mmol/L      ANION GAP 14 mmol/L      BUN 13 mg/dL      Creatinine 1 06 mg/dL      Glucose 312 mg/dL      Calcium 9 1 mg/dL      AST 49 U/L       U/L      Alkaline Phosphatase 86 U/L      Total Protein 7 2 g/dL      Albumin 4 1 g/dL      Total Bilirubin 0 80 mg/dL      eGFR 81 ml/min/1 73sq m     Narrative:       National Kidney Disease Foundation guidelines for Chronic Kidney Disease (CKD):     Stage 1 with normal or high GFR (GFR > 90 mL/min/1 73 square meters)    Stage 2 Mild CKD (GFR = 60-89 mL/min/1 73 square meters)    Stage 3A Moderate CKD (GFR = 45-59 mL/min/1 73 square meters)    Stage 3B Moderate CKD (GFR = 30-44 mL/min/1 73 square meters)    Stage 4 Severe CKD (GFR = 15-29 mL/min/1 73 square meters)    Stage 5 End Stage CKD (GFR <15 mL/min/1 73 square meters)  Note: GFR calculation is accurate only with a steady state creatinine    Lipase [[id number]]  (Normal) Collected:  11/23/19 1350    Lab Status:  Final result Specimen:  Blood from Arm, Right Updated:  11/23/19 1423     Lipase 326 u/L     Protime-INR [[id number]]  (Normal) Collected:  11/23/19 1350    Lab Status:  Final result Specimen:  Blood from Arm, Right Updated:  11/23/19 1422     Protime 13 3 seconds      INR 1 01    APTT [[id number]]  (Normal) Collected:  11/23/19 1350    Lab Status:  Final result Specimen:  Blood from Arm, Right Updated:  11/23/19 1422     PTT 28 seconds     CBC and differential [[id number]]  (Abnormal) Collected:  11/23/19 1350    Lab Status:  Final result Specimen:  Blood from Arm, Right Updated:  11/23/19 1405     WBC 20 15 Thousand/uL      RBC 5 34 Million/uL      Hemoglobin 16 6 g/dL      Hematocrit 47 1 %      MCV 88 fL      MCH 31 1 pg      MCHC 35 2 g/dL      RDW 12 4 %      MPV 9 8 fL      Platelets 471 Thousands/uL      nRBC 0 /100 WBCs      Neutrophils Relative 89 %      Immat GRANS % 0 %      Lymphocytes Relative 5 %      Monocytes Relative 6 %      Eosinophils Relative 0 %      Basophils Relative 0 %      Neutrophils Absolute 17 85 Thousands/µL      Immature Grans Absolute 0 08 Thousand/uL      Lymphocytes Absolute 0 97 Thousands/µL      Monocytes Absolute 1 19 Thousand/µL      Eosinophils Absolute 0 03 Thousand/µL      Basophils Absolute 0 03 Thousands/µL                  US scrotum and testicles   Final Result by Kinga Elder MD (11/23 1555)       1  Scrotal wall thickening, edema, and inflammation  2  Normal testes  Workstation performed: [id number]         CT abdomen pelvis with contrast   Final Result by Ambika Carpenter MD (11/23 1531)      1  Colonic diverticulosis without diverticulitis or other acute abdominopelvic findings  2   Mild nonspecific swelling in the subcutaneous tissues of the bilateral inguinal region  Correlate with direct inspection for evidence of cellulitis  Workstation performed: [id number]                    Procedures  Procedures       ED Course  ED Course as of Nov 23 1559   Sat Nov 23, 2019   1438 LACTIC ACID(!!): 3 1             MDM  Number of Diagnoses or Management Options  Cellulitis of groin: new and requires workup  Severe sepsis [facility]): new and requires workup  Diagnosis management comments: 51yo male presenting for evaluation of multiple complaints including groin pain/redness, chills, and diarrhea  Patient afebrile on arrival but tachycardic to the 120s  Exam reveals well demarcated area of erythema in groin region  Enlargement appreciated in bilateral testes with significant pain  No crepitus  Hemoccult positive on rectal exam  Differential diagnosis includes but is not limited to: cellulitis, orchitis, epididymitis, prostatitis, UTI, gastritis, infectious diarrhea, esophagitis, low suspicion for Mary's gangrene  Initial ED plan: Septic workup including blood cultures and lactate  Will image with CT abdomen and ultrasound of scrotum for further evaluation  IV fluids and Toradol for pain relief      Final assessment: Labs reveal significant leukocytosis with WBC count 20  Lactate also elevated at 3 1  Glucose elevated >300 although patient has no history of diabetes  HbA1c added on for further evaluation  UA not suggestive of infection  CT reveals evidence of cellulitis  Ultrasound reveals scrotal wall thickening and evidence of inflammation  Patient meets criteria for severe sepsis  IV Rocephin and 2nd liter fluid bolus ordered  Patient admitted for further management  Amount and/or Complexity of Data Reviewed  Clinical lab tests: ordered and reviewed  Tests in the radiology section of CPT®: ordered and reviewed  Review and summarize past medical records: yes  Discuss the patient with other providers: yes  Independent visualization of images, tracings, or specimens: yes    Risk of Complications, Morbidity, and/or Mortality  Presenting problems: high  Diagnostic procedures: high  Management options: high    Patient Progress  Patient progress: stable      Disposition  Final diagnoses:   Cellulitis of groin   Severe sepsis ([address] )     Time reflects when diagnosis was documented in both MDM as applicable and the Disposition within this note     Time User Action Codes Description Comment    11/23/2019  3:58  [facility] [[id number]] Cellulitis of groin     11/23/2019  3:58 PM [address] [place] [address] [A41 9,  R65 20] Severe sepsis [facility])       ED Disposition     ED Disposition Condition Date/Time Comment    Admit Stable Sat Nov 23, 2019  3:58 PM Case was discussed with Dr Antonio Feldman and the patient's admission status was agreed to be Admission Status: inpatient status to the service of Dr Antonio Higgins    None         Patient's Medications   Discharge Prescriptions    No medications on file     No discharge procedures on file      ED Provider  Electronically Signed by           Keri Salgado PA-C  11/23/19 8817

## 2019-11-23 NOTE — ASSESSMENT & PLAN NOTE
· Pt reported multiple episodes of diarrhea s/p vacation in [facility]   · Lasted for 2 days and then resolved  · Denies any nausea/vomiting or additional episodes of diarrhea   · Continue to monitor stool output  · Supportive care with IVF hydration

## 2019-11-23 NOTE — H&P
H&P- [place] Murray 1970, 47 y o  male MRN: [id number]    Unit/Bed#: -01 Encounter: [id number]    Primary Care Provider: Kami Renteria   Date and time admitted to hospital: 11/23/2019 12:33 PM      DOS: 11/23/2019    * Severe sepsis ([address] )  Assessment & Plan  · POA, as evidenced by tachycardia, leukocytosis, lactic acidosis and scrotal cellulitis on US  · Pt reports history of scrotal cellulitis as outpatient, previously treated with amoxicillin with resolution   · Place on aggressive IVF hydration   · IV Zosyn and vancomycin for anaerobic coverage  · Urology consultation  · HgbA1c, possible underlying DM causing recurrent infections  · Blood cultures pending  · UA negative   · Supportive care with PRN tylenol, pt denies any severe pain currently   · Continue to trend temps/WBC closely     Melena  Assessment & Plan  · Pt reports BRBPR in the toilet today and then an episode of dark stool, concerning for possible melena  · Hgb is very stable at 16 6 currently, likely hemo concentrated secondary to dehydration   · Place on IV protonix as patient has some epigastric discomfort  · Monitor stool output and hgb levels, if significant down trend consider GI consultation   · Supportive care with IVF    Transaminitis  Assessment & Plan  · Mild, no previous blood work to compare  · AST 49,    · Could be secondary to recent gastroenteritis   · Repeat CMP in the AM     Gastroenteritis  Assessment & Plan  · Pt reported multiple episodes of diarrhea s/p vacation in [facility]   · Lasted for 2 days and then resolved  · Denies any nausea/vomiting or additional episodes of diarrhea   · Continue to monitor stool output  · Supportive care with IVF hydration     AYLA (obstructive sleep apnea)  Assessment & Plan  · Pt requires Bipap at night  · Continue here    Hyperglycemia  Assessment & Plan  · Glucose 312 on admission  · Pt denies history of DM in the past, reports following up with PCP/urology on fairly regular basis  · Obtain hgbA1c, likely uncontrolled DM in setting of recurrent infections  · Place on consistent carb diet  · QID glucose checks  · Low dose SSI coverage pending hgbA1c  · Pt also reports possible neuropathy, pending A1c could trial gabapentin     Cellulitis of groin  Assessment & Plan  · X 24 hours, pt reports edema and warmth, denies any significant pain   · Scrotal US with scrotal wall thickening, edema and inflammation, testes appear normal  · Pt reports recurrent episodes, previously treated with amoxicillin outpatient with resolution   · Urology consultation  · IV Zosyn and vancomycin   · If no significant improvement would consider ID consultation       VTE Prophylaxis: Enoxaparin (Lovenox)  / sequential compression device   Code Status: Level I - full code   POLST: There is no POLST form on file for this patient (pre-hospital)  Discussion with family: Discussed with patient and patient's wife at bedside regarding plan of care  Anticipated Length of Stay:  Patient will be admitted on an Inpatient basis with an anticipated length of stay of  > 2 midnights  Justification for Hospital Stay: Pt with scrotal cellulitis requiring IV abx therapy, IVF hydration and urology consultation     Total Time for Visit, including Counseling / Coordination of Care: 30 minutes  Greater than 50% of this total time spent on direct patient counseling and coordination of care  Chief Complaint:   "We just got back from [facility] "    History of Present Illness:    Praveen Porter is a 47 y o  male significant past medical history of AYLA on BiPAP, recurrent scrotal cellulitis who presents with scrotal edema and warmth x1 day  Patient reports that him and his wife recently got back from a vacation in [facility] on Saturday  Patient reports that he was feeling well for couple days afterwards but then developed multiple episodes of diarrhea on Tuesday and Wednesday  Reports that he felt ill and rested for both of those days  Denied any nausea or vomiting at that time  Did have some abdominal cramping with the episodes of diarrhea  Reports that after the few days this resolved and he was feeling well again  However reports that last night and into today he had 1 episode of bright red blood per rectum that filled the toilet bowl  He denied any additional diarrhea  States he had another bowel movement after that which was melanotic and black in color  He has never had an endoscopy or colonoscopy before but he is scheduled for colonoscopy as he recently turned 47years old  Reports that today he felt warm in his groin/scrotal region  He reports that he has a history of scrotal cellulitis in the past and was treated with amoxicillin as outpatient with resolution  He is unsure why he continues to get these recurrent infections  He denies any dysuria or hematuria  He denies any significant pain in the scrotal region  Patient and family recently moved here about 1 year ago from [place]  They were established with a PCP which he sees frequently  He denies any prior history of diabetes in the past     Review of Systems:    Review of Systems   Constitutional: Negative for chills, diaphoresis, fatigue and fever  HENT: Negative for congestion, sinus pressure, sinus pain, sneezing, sore throat, tinnitus and trouble swallowing  Eyes: Negative  Negative for visual disturbance  Respiratory: Negative for cough, chest tightness, shortness of breath and wheezing  Cardiovascular: Negative for chest pain, palpitations and leg swelling  Gastrointestinal: Positive for abdominal pain, blood in stool and diarrhea  Negative for constipation, nausea and vomiting  Genitourinary: Negative for difficulty urinating, flank pain, frequency and urgency  Musculoskeletal: Negative for back pain, joint swelling and myalgias  Skin: Negative for color change, pallor and rash     Neurological: Negative for dizziness, syncope, light-headedness and headaches  Past Medical and Surgical History:     Past Medical History:   Diagnosis Date    AYLA treated with BiPAP        Past Surgical History:   Procedure Laterality Date    VASECTOMY         Meds/Allergies:    Prior to Admission medications    Medication Sig Start Date End Date Taking? Authorizing Provider   multivitamin (THERAGRAN) TABS Take 1 tablet by mouth daily   Yes Historical Provider, MD   amoxicillin-clavulanate (AUGMENTIN) 875-125 mg per tablet Take 1 tablet by mouth every 12 (twelve) hours  11/23/19 Yes Historical Provider, MD     I have reviewed home medications with patient personally  Allergies:    Allergies   Allergen Reactions    Codeine        Social History:     Marital Status: /Civil Union   Occupation:   Patient Pre-hospital Living Situation:  Patient lives at private residence with wife  Patient Pre-hospital Level of Mobility:  Full mobility  Patient Pre-hospital Diet Restrictions:  None  Substance Use History:   Social History     Substance and Sexual Activity   Alcohol Use Yes    Alcohol/week: 2 0 standard drinks    Types: 2 Glasses of wine per week    Frequency: Monthly or less    Drinks per session: 1 or 2    Binge frequency: Less than monthly    Comment: occ     Social History     Tobacco Use   Smoking Status Current Some Day Smoker    Packs/day: 0 25    Years: 1 00    Pack years: 0 25    Types: Cigars   Smokeless Tobacco Never Used   Tobacco Comment    occ     Social History     Substance and Sexual Activity   Drug Use Never       Family History:    non-contributory    Physical Exam:     Vitals:   Blood Pressure: 122/76 (11/23/19 1652)  Pulse: (!) 113 (11/23/19 1652)  Temperature: 99 °F (37 2 °C) (11/23/19 1652)  Temp Source: Oral (11/23/19 1238)  Respirations: 18 (11/23/19 1652)  Height: 6' 1" (185 4 cm) (11/23/19 1652)  Weight - Scale: 106 kg (233 lb 11 oz) (11/23/19 1652)  SpO2: 96 % (11/23/19 1652)    Physical Exam   Constitutional: No distress  Patient is in no acute distress sitting in his hospital chair resting comfortably accompanied by his wife  Nontoxic appearing, cooperative  HENT:   Head: Normocephalic and atraumatic  Eyes: Pupils are equal, round, and reactive to light  Conjunctivae are normal    Cardiovascular: Regular rhythm and intact distal pulses  Mildly tachycardic   Pulmonary/Chest: Effort normal and breath sounds normal  No stridor  No respiratory distress  He has no wheezes  Abdominal: Soft  Bowel sounds are normal  He exhibits no distension  There is tenderness (Mild to palpation in the epigastric region)  There is no guarding  Genitourinary:   Genitourinary Comments: Nursing staff and attending physician present at bedside for  evaluation  Significantly edematous scrotum bilaterally with erythema of the bilateral groin and suprapubic region  Musculoskeletal: He exhibits no edema  Neurological: He is alert  Skin: Skin is warm and dry  He is not diaphoretic  There is erythema  Vitals reviewed  Additional Data:     Lab Results: I have personally reviewed pertinent reports  Results from last 7 days   Lab Units 11/23/19  1350   WBC Thousand/uL 20 15*   HEMOGLOBIN g/dL 16 6   HEMATOCRIT % 47 1   PLATELETS Thousands/uL 200   NEUTROS PCT % 89*   LYMPHS PCT % 5*   MONOS PCT % 6   EOS PCT % 0     Results from last 7 days   Lab Units 11/23/19  1350   SODIUM mmol/L 136   POTASSIUM mmol/L 3 8   CHLORIDE mmol/L 98*   CO2 mmol/L 24   BUN mg/dL 13   CREATININE mg/dL 1 06   ANION GAP mmol/L 14*   CALCIUM mg/dL 9 1   ALBUMIN g/dL 4 1   TOTAL BILIRUBIN mg/dL 0 80   ALK PHOS U/L 86   ALT U/L 108*   AST U/L 49*   GLUCOSE RANDOM mg/dL 312*     Results from last 7 days   Lab Units 11/23/19  1350   INR  1 01             Results from last 7 days   Lab Units 11/23/19  1555 11/23/19  1350   LACTIC ACID mmol/L 2 0 3 1*       Imaging: I have personally reviewed pertinent reports        US scrotum and testicles   Final Result by Андрей Reeder MD (11/23 3700)       1  Scrotal wall thickening, edema, and inflammation  2  Normal testes  Workstation performed: [id number]         CT abdomen pelvis with contrast   Final Result by Barbara Escoto MD (11/23 7551)      1  Colonic diverticulosis without diverticulitis or other acute abdominopelvic findings  2   Mild nonspecific swelling in the subcutaneous tissues of the bilateral inguinal region  Correlate with direct inspection for evidence of cellulitis  Workstation performed: [id number]             EKG, Pathology, and Other Studies Reviewed on Admission:   · Scrotal US and CT abdomen/pelvis results review     Allscripts / Epic Records Reviewed: Yes     ** Please Note: This note has been constructed using a voice recognition system   **

## 2019-11-23 NOTE — ASSESSMENT & PLAN NOTE
· Pt reports BRBPR in the toilet today and then an episode of dark stool, concerning for possible melena  · Hgb is very stable at 16 6 currently, likely hemo concentrated secondary to dehydration   · Place on IV protonix as patient has some epigastric discomfort  · Monitor stool output and hgb levels, if significant down trend consider GI consultation   · Supportive care with IVF

## 2019-11-23 NOTE — SEPSIS NOTE
Sepsis Note   Dionisio Alvarez 47 y o  male MRN: [id number]  Unit/Bed#: ED 27 Encounter: [id number]      qSOFA     Row Name 19 1338 19 1238             Altered mental status GCS < 15  --  --       Respiratory Rate > / =22  0  0       Systolic BP < / =768  0  0       Q Sofa Score  0  0           Initial Sepsis Screening     Row Name 19 1440                Is the patient's history suggestive of a new or worsening infection? (!) Yes (Proceed)  -MM        Suspected source of infection  soft tissue;urinary tract infection  -MM        Are two or more of the following signs & symptoms of infection both present and new to the patient? (!) Yes (Proceed)  -MM        Indicate SIRS criteria  Tachycardia > 90 bpm;Leukocytosis (WBC > 84127 IJL)  -MM        If the answer is yes to both questions, suspicion of sepsis is present  --        If severe sepsis is present AND tissue hypoperfusion perists in the hour after fluid resuscitation or lactate > 4, the patient meets criteria for SEPTIC SHOCK  --        Are any of the following organ dysfunction criteria present within 6 hours of suspected infection and SIRS criteria that are NOT considered to be chronic conditions?   (!) Yes  -MM        Organ dysfunction  Lactate > 2 0 mmol/L  -MM        Date of presentation of severe sepsis  19  -MM        Time of presentation of severe sepsis  1440  -MM        Tissue hypoperfusion persists in the hour after crystalloid fluid administration, evidenced, by either:  --        Was hypotension present within one hour of the conclusion of crystalloid fluid administration?  --        Date of presentation of septic shock  --        Time of presentation of septic shock  --          User Key  (r) = Recorded By, (t) = Taken By, (c) = Cosigned By    [address] Name Provider Type    TONIA Cunningham PA-C Physician Assistant

## 2019-11-23 NOTE — PLAN OF CARE
Problem: INFECTION - ADULT  Goal: Absence or prevention of progression during hospitalization  Description  INTERVENTIONS:  - Assess and monitor for signs and symptoms of infection  - Monitor lab/diagnostic results  - Monitor all insertion sites, i e  indwelling lines, tubes, and drains  - Monitor endotracheal if appropriate and nasal secretions for changes in amount and color  - [place] appropriate cooling/warming therapies per order  - Administer medications as ordered  - Instruct and encourage patient and family to use good hand hygiene technique  - Identify and instruct in appropriate isolation precautions for identified infection/condition  Outcome: Progressing  Goal: Absence of fever/infection during neutropenic period  Description  INTERVENTIONS:  - Monitor WBC    Outcome: Progressing     Problem: SAFETY ADULT  Goal: Patient will remain free of falls  Description  INTERVENTIONS:  - Assess patient frequently for physical needs  -  Identify cognitive and physical deficits and behaviors that affect risk of falls    -  [place] fall precautions as indicated by assessment   - Educate patient/family on patient safety including physical limitations  - Instruct patient to call for assistance with activity based on assessment  - Modify environment to reduce risk of injury  - Consider OT/PT consult to assist with strengthening/mobility  Outcome: Progressing  Goal: Maintain or return to baseline ADL function  Description  INTERVENTIONS:  -  Assess patient's ability to carry out ADLs; assess patient's baseline for ADL function and identify physical deficits which impact ability to perform ADLs (bathing, care of mouth/teeth, toileting, grooming, dressing, etc )  - Assess/evaluate cause of self-care deficits   - Assess range of motion  - Assess patient's mobility; develop plan if impaired  - Assess patient's need for assistive devices and provide as appropriate  - Encourage maximum independence but intervene and supervise when necessary  - Involve family in performance of ADLs  - Assess for home care needs following discharge   - Consider OT consult to assist with ADL evaluation and planning for discharge  - Provide patient education as appropriate  Outcome: Progressing  Goal: Maintain or return mobility status to optimal level  Description  INTERVENTIONS:  - Assess patient's baseline mobility status (ambulation, transfers, stairs, etc )    - Identify cognitive and physical deficits and behaviors that affect mobility  - Identify mobility aids required to assist with transfers and/or ambulation (gait belt, sit-to-stand, lift, walker, cane, etc )  - [place] fall precautions as indicated by assessment  - Record patient progress and toleration of activity level on Mobility SBAR; progress patient to next Phase/Stage  - Instruct patient to call for assistance with activity based on assessment  - Consider rehabilitation consult to assist with strengthening/weightbearing, etc   Outcome: Progressing     Problem: GASTROINTESTINAL - ADULT  Goal: Minimal or absence of nausea and/or vomiting  Description  INTERVENTIONS:  - Administer IV fluids if ordered to ensure adequate hydration  - Maintain NPO status until nausea and vomiting are resolved  - Nasogastric tube if ordered  - Administer ordered antiemetic medications as needed  - Provide nonpharmacologic comfort measures as appropriate  - Advance diet as tolerated, if ordered  - Consider nutrition services referral to assist patient with adequate nutrition and appropriate food choices  Outcome: Progressing  Goal: Maintains or returns to baseline bowel function  Description  INTERVENTIONS:  - Assess bowel function  - Encourage oral fluids to ensure adequate hydration  - Administer IV fluids if ordered to ensure adequate hydration  - Administer ordered medications as needed  - Encourage mobilization and activity  - Consider nutritional services referral to assist patient with adequate nutrition and appropriate food choices  Outcome: Progressing  Goal: Maintains adequate nutritional intake  Description  INTERVENTIONS:  - Monitor percentage of each meal consumed  - Identify factors contributing to decreased intake, treat as appropriate  - Assist with meals as needed  - Monitor I&O, weight, and lab values if indicated  - Obtain nutrition services referral as needed  Outcome: Progressing  Goal: Establish and maintain optimal ostomy function  Description  INTERVENTIONS:  - Assess bowel function  - Encourage oral fluids to ensure adequate hydration  - Administer IV fluids if ordered to ensure adequate hydration   - Administer ordered medications as needed  - Encourage mobilization and activity  - Nutrition services referral to assist patient with appropriate food choices  - Assess stoma site  - Consider wound care consult   Outcome: Progressing     Problem: METABOLIC, FLUID AND ELECTROLYTES - ADULT  Goal: Electrolytes maintained within normal limits  Description  INTERVENTIONS:  - Monitor labs and assess patient for signs and symptoms of electrolyte imbalances  - Administer electrolyte replacement as ordered  - Monitor response to electrolyte replacements, including repeat lab results as appropriate  - Instruct patient on fluid and nutrition as appropriate  Outcome: Progressing  Goal: Fluid balance maintained  Description  INTERVENTIONS:  - Monitor labs   - Monitor I/O and WT  - Instruct patient on fluid and nutrition as appropriate  - Assess for signs & symptoms of volume excess or deficit  Outcome: Progressing  Goal: Glucose maintained within target range  Description  INTERVENTIONS:  - Monitor Blood Glucose as ordered  - Assess for signs and symptoms of hyperglycemia and hypoglycemia  - Administer ordered medications to maintain glucose within target range  - Assess nutritional intake and initiate nutrition service referral as needed  Outcome: Progressing

## 2019-11-24 PROBLEM — E11.65 DIABETES MELLITUS WITH HYPERGLYCEMIA (HCC): Status: ACTIVE | Noted: 2019-11-23

## 2019-11-24 PROBLEM — R74.01 TRANSAMINITIS: Status: RESOLVED | Noted: 2019-11-23 | Resolved: 2019-11-24

## 2019-11-24 LAB
ALBUMIN SERPL BCP-MCNC: 3.3 G/DL (ref 3.5–5)
ALP SERPL-CCNC: 69 U/L (ref 46–116)
ALT SERPL W P-5'-P-CCNC: 73 U/L (ref 12–78)
ANION GAP SERPL CALCULATED.3IONS-SCNC: 13 MMOL/L (ref 4–13)
AST SERPL W P-5'-P-CCNC: 25 U/L (ref 5–45)
BILIRUB SERPL-MCNC: 1.2 MG/DL (ref 0.2–1)
BUN SERPL-MCNC: 13 MG/DL (ref 5–25)
CALCIUM SERPL-MCNC: 8.5 MG/DL (ref 8.3–10.1)
CHLORIDE SERPL-SCNC: 101 MMOL/L (ref 100–108)
CO2 SERPL-SCNC: 22 MMOL/L (ref 21–32)
CREAT SERPL-MCNC: 1.02 MG/DL (ref 0.6–1.3)
ERYTHROCYTE [DISTWIDTH] IN BLOOD BY AUTOMATED COUNT: 12.4 % (ref 11.6–15.1)
GFR SERPL CREATININE-BSD FRML MDRD: 85 ML/MIN/1.73SQ M
GLUCOSE SERPL-MCNC: 249 MG/DL (ref 65–140)
GLUCOSE SERPL-MCNC: 250 MG/DL (ref 65–140)
GLUCOSE SERPL-MCNC: 274 MG/DL (ref 65–140)
GLUCOSE SERPL-MCNC: 288 MG/DL (ref 65–140)
GLUCOSE SERPL-MCNC: 292 MG/DL (ref 65–140)
HCT VFR BLD AUTO: 43.6 % (ref 36.5–49.3)
HGB BLD-MCNC: 15 G/DL (ref 12–17)
MCH RBC QN AUTO: 30.9 PG (ref 26.8–34.3)
MCHC RBC AUTO-ENTMCNC: 34.4 G/DL (ref 31.4–37.4)
MCV RBC AUTO: 90 FL (ref 82–98)
PLATELET # BLD AUTO: 188 THOUSANDS/UL (ref 149–390)
PMV BLD AUTO: 10 FL (ref 8.9–12.7)
POTASSIUM SERPL-SCNC: 3.7 MMOL/L (ref 3.5–5.3)
PROT SERPL-MCNC: 6.3 G/DL (ref 6.4–8.2)
RBC # BLD AUTO: 4.85 MILLION/UL (ref 3.88–5.62)
SODIUM SERPL-SCNC: 136 MMOL/L (ref 136–145)
WBC # BLD AUTO: 12.91 THOUSAND/UL (ref 4.31–10.16)

## 2019-11-24 PROCEDURE — 94760 N-INVAS EAR/PLS OXIMETRY 1: CPT

## 2019-11-24 PROCEDURE — 82948 REAGENT STRIP/BLOOD GLUCOSE: CPT

## 2019-11-24 PROCEDURE — C9113 INJ PANTOPRAZOLE SODIUM, VIA: HCPCS | Performed by: PHYSICIAN ASSISTANT

## 2019-11-24 PROCEDURE — 99222 1ST HOSP IP/OBS MODERATE 55: CPT | Performed by: UROLOGY

## 2019-11-24 PROCEDURE — 94660 CPAP INITIATION&MGMT: CPT

## 2019-11-24 PROCEDURE — 85027 COMPLETE CBC AUTOMATED: CPT | Performed by: PHYSICIAN ASSISTANT

## 2019-11-24 PROCEDURE — 80053 COMPREHEN METABOLIC PANEL: CPT | Performed by: PHYSICIAN ASSISTANT

## 2019-11-24 PROCEDURE — 99232 SBSQ HOSP IP/OBS MODERATE 35: CPT | Performed by: PHYSICIAN ASSISTANT

## 2019-11-24 RX ORDER — PANTOPRAZOLE SODIUM 40 MG/1
40 TABLET, DELAYED RELEASE ORAL
Status: DISCONTINUED | OUTPATIENT
Start: 2019-11-25 | End: 2019-11-28 | Stop reason: HOSPADM

## 2019-11-24 RX ORDER — GABAPENTIN 100 MG/1
100 CAPSULE ORAL 2 TIMES DAILY
Status: DISCONTINUED | OUTPATIENT
Start: 2019-11-24 | End: 2019-11-28 | Stop reason: HOSPADM

## 2019-11-24 RX ORDER — METRONIDAZOLE 500 MG/1
500 TABLET ORAL EVERY 8 HOURS SCHEDULED
Status: DISCONTINUED | OUTPATIENT
Start: 2019-11-25 | End: 2019-11-28

## 2019-11-24 RX ORDER — INSULIN GLARGINE 100 [IU]/ML
10 INJECTION, SOLUTION SUBCUTANEOUS
Status: DISCONTINUED | OUTPATIENT
Start: 2019-11-24 | End: 2019-11-25

## 2019-11-24 RX ADMIN — PIPERACILLIN SODIUM,TAZOBACTAM SODIUM 3.38 G: 3; .375 INJECTION, POWDER, FOR SOLUTION INTRAVENOUS at 05:29

## 2019-11-24 RX ADMIN — ACETAMINOPHEN 650 MG: 325 TABLET, FILM COATED ORAL at 05:35

## 2019-11-24 RX ADMIN — INSULIN LISPRO 2 UNITS: 100 INJECTION, SOLUTION INTRAVENOUS; SUBCUTANEOUS at 08:01

## 2019-11-24 RX ADMIN — PIPERACILLIN SODIUM,TAZOBACTAM SODIUM 3.38 G: 3; .375 INJECTION, POWDER, FOR SOLUTION INTRAVENOUS at 16:49

## 2019-11-24 RX ADMIN — INSULIN LISPRO 2 UNITS: 100 INJECTION, SOLUTION INTRAVENOUS; SUBCUTANEOUS at 11:57

## 2019-11-24 RX ADMIN — SODIUM CHLORIDE 125 ML/HR: 0.9 INJECTION, SOLUTION INTRAVENOUS at 16:55

## 2019-11-24 RX ADMIN — PIPERACILLIN SODIUM,TAZOBACTAM SODIUM 3.38 G: 3; .375 INJECTION, POWDER, FOR SOLUTION INTRAVENOUS at 11:29

## 2019-11-24 RX ADMIN — VANCOMYCIN HYDROCHLORIDE 1750 MG: 1 INJECTION, POWDER, LYOPHILIZED, FOR SOLUTION INTRAVENOUS at 17:41

## 2019-11-24 RX ADMIN — ENOXAPARIN SODIUM 40 MG: 40 INJECTION SUBCUTANEOUS at 08:36

## 2019-11-24 RX ADMIN — VANCOMYCIN HYDROCHLORIDE 1750 MG: 1 INJECTION, POWDER, LYOPHILIZED, FOR SOLUTION INTRAVENOUS at 06:33

## 2019-11-24 RX ADMIN — GABAPENTIN 100 MG: 100 CAPSULE ORAL at 17:41

## 2019-11-24 RX ADMIN — SODIUM CHLORIDE 125 ML/HR: 0.9 INJECTION, SOLUTION INTRAVENOUS at 05:29

## 2019-11-24 RX ADMIN — INSULIN LISPRO 2 UNITS: 100 INJECTION, SOLUTION INTRAVENOUS; SUBCUTANEOUS at 16:55

## 2019-11-24 RX ADMIN — PANTOPRAZOLE SODIUM 40 MG: 40 INJECTION, POWDER, FOR SOLUTION INTRAVENOUS at 20:06

## 2019-11-24 RX ADMIN — PIPERACILLIN SODIUM,TAZOBACTAM SODIUM 3.38 G: 3; .375 INJECTION, POWDER, FOR SOLUTION INTRAVENOUS at 00:00

## 2019-11-24 RX ADMIN — PANTOPRAZOLE SODIUM 40 MG: 40 INJECTION, POWDER, FOR SOLUTION INTRAVENOUS at 08:36

## 2019-11-24 RX ADMIN — INSULIN LISPRO 5 UNITS: 100 INJECTION, SOLUTION INTRAVENOUS; SUBCUTANEOUS at 16:55

## 2019-11-24 RX ADMIN — INSULIN GLARGINE 10 UNITS: 100 INJECTION, SOLUTION SUBCUTANEOUS at 21:00

## 2019-11-24 RX ADMIN — INSULIN LISPRO 2 UNITS: 100 INJECTION, SOLUTION INTRAVENOUS; SUBCUTANEOUS at 21:03

## 2019-11-24 NOTE — ASSESSMENT & PLAN NOTE
· Newly diagnosed diabetes mellitus with hgbA1c 11 9%  · Start patient on low dose long acting Lantus 10 units QHS and 5 units humalog TID AC with SSI coverage based on TDD, pt is insulin naive and will start low  · Monitor QID glucose checks  · Continue consistent carb diet, nutrition consultation   · Pt also reports neuropathic pain, will trial gabapentin 100 mg BID and titrate as needed  · Attempt to obtain [place] from [place] ([facility]) as patient reports he recently had blood work there in May when he had a previous cellulitic infection   · Likely etiology for recurrent scrotal infections

## 2019-11-24 NOTE — ASSESSMENT & PLAN NOTE
· POA, as evidenced by tachycardia, leukocytosis, lactic acidosis and scrotal cellulitis on US, improving  · Pt reports history of scrotal cellulitis as outpatient, previously treated with amoxicillin with resolution   · Continue IVF hydration   · IV Zosyn and vancomycin for anaerobic coverage for now as improvement is noted  · Urology consultation pending  · HgbA1c significantly elevated at 11 9%, likely etiology for recurrent scrotal infections   · Blood cultures pending  · UA negative   · Supportive care with PRN tylenol, pt denies any severe pain currently   · Continue to trend temps/WBC closely

## 2019-11-24 NOTE — CONSULTS
UROLOGY CONSULTATION NOTE     Patient Identifiers: Sally Biswas (MRN [id number])  Service Requesting Consultation:  Hospital Medicine  Service Providing Consultation:  Urology, Mychal Ramirez MD    Date of Service: 11/24/2019  Consults    Reason for Consultation:  Genital cellulitis and edema    History of Present Illness:     Sally Biswas is a 47 y o  old with recurrent episode of genital cellulitis and edema  He began to feel ill several days ago with diarrhea, fevers and chills  24 hours before admission, developed severe genital edema with erythema on the penile and scrotal skin  Signs of sepsis white count 17074, elevated lactic acid    He reports three episodes of this over the past two years, each time it occurs very rapidly with no symptoms before it  Diagnosed with cellulitis, usually treated with amoxacillin with gradual resolution  On admission found to have blood sugar of over 350, hemoglobin A1c of 11 9  He says he has never had any blood sugar issues before  No voiding symptoms except for the issue of penile retraction from the edema, causing some spraying of the flow  When he does not have the edema, no voiding symptoms at all  ASSESSMENT & PLAN:     Unclear cause of recurrent episodes of cellulitis  Certainly, undiagnosed diabetes could be related to this  CT scan shows no pelvic conditions such as lymphedema that would be related to this  No evidence of abscess either  What he is on proper antibiotics, we will follow his condition along with you  Nothing specific needed except for supportive care, elevation if necessary because of the weight of the scrotum      Past Medical, Past Surgical History:     Past Medical History:   Diagnosis Date    AYLA treated with BiPAP    :    Past Surgical History:   Procedure Laterality Date    VASECTOMY     :    Medications, Allergies:     Current Facility-Administered Medications   Medication Dose Route Frequency    acetaminophen (TYLENOL) tablet 650 mg  650 mg Oral Q6H PRN    enoxaparin (LOVENOX) subcutaneous injection 40 mg  40 mg Subcutaneous Daily    gabapentin (NEURONTIN) capsule 100 mg  100 mg Oral BID    insulin glargine (LANTUS) subcutaneous injection 10 Units 0 1 mL  10 Units Subcutaneous HS    insulin lispro (HumaLOG) 100 units/mL subcutaneous injection 1-5 Units  1-5 Units Subcutaneous TID AC    insulin lispro (HumaLOG) 100 units/mL subcutaneous injection 1-5 Units  1-5 Units Subcutaneous HS    insulin lispro (HumaLOG) 100 units/mL subcutaneous injection 5 Units  5 Units Subcutaneous TID With Meals    pantoprazole (PROTONIX) injection 40 mg  40 mg Intravenous Q12H [facility]    piperacillin-tazobactam (ZOSYN) 3 375 g in sodium chloride 0 9 % 50 mL IVPB  3 375 g Intravenous Q6H    sodium chloride 0 9 % infusion  125 mL/hr Intravenous Continuous    vancomycin (VANCOCIN) 1,750 mg in sodium chloride 0 9 % 500 mL IVPB  20 mg/kg (Adjusted) Intravenous Q12H       Allergies: Allergies   Allergen Reactions    Codeine    :    Social and Family History:   Social History:   Social History     Tobacco Use    Smoking status: Current Some Day Smoker     Packs/day: 0 25     Years: 1 00     Pack years: 0 25     Types: Cigars    Smokeless tobacco: Never Used    Tobacco comment: occ   Substance Use Topics    Alcohol use: Yes     Alcohol/week: 2 0 standard drinks     Types: 2 Glasses of wine per week     Frequency: Monthly or less     Drinks per session: 1 or 2     Binge frequency: Less than monthly     Comment: occ    Drug use: Never     Social History     Tobacco Use   Smoking Status Current Some Day Smoker    Packs/day: 0 25    Years: 1 00    Pack years: 0 25    Types: Cigars   Smokeless Tobacco Never Used   Tobacco Comment    occ       Family History:  History reviewed  No pertinent family history :     Review of Systems:     General: Marguerite Fever, chills, or night sweats  Cardiac: Negative for chest pain      Pulmonary: Negative for shortness of breath  Gastrointestinal: Denies Abdominal pain, no  nausea, vomiting, or diarrhea   Genitourinary: See HPI above  Neurologic: No focal signs   Musculo-skeletal: No complaints    All other systems queried were negative  Physical Exam:     General appearance: alert and oriented, in no acute distress  Head: Normocephalic, without obvious abnormality, atraumatic  Eyes: conjunctivae/corneas clear  PERRL, EOM's intact  Fundi benign  Throat: lips, mucosa, and tongue normal; teeth and gums normal  Neck: no adenopathy, no carotid bruit, no JVD, supple, symmetrical, trachea midline and thyroid not enlarged, symmetric, no tenderness/mass/nodules  Back: symmetric, no curvature  ROM normal  No CVA tenderness  Lungs: clear to auscultation bilaterally  Chest wall: no tenderness  Heart: regular rate and rhythm, S1, S2 normal, no murmur, click, rub or gallop  Abdomen: soft, non-tender; bowel sounds normal; no masses,  no organomegaly  Male genitalia: Moderate to severe genital edema, with generalized erythema on the skin of the scrotum and inguinal region  No abscess  Lymph nodes: Cervical, supraclavicular, and axillary nodes normal   Neurologic: Grossly normal      Labs:     Lab Results   Component Value Date    HGB 15 0 11/24/2019    HCT 43 6 11/24/2019    WBC 12 91 (H) 11/24/2019     11/24/2019   ]    Lab Results   Component Value Date    K 3 7 11/24/2019     11/24/2019    CO2 22 11/24/2019    BUN 13 11/24/2019    CREATININE 1 02 11/24/2019    CALCIUM 8 5 11/24/2019   ]    Imaging:   I personally reviewed the images and report of the following studies, and reviewed them with the patient:  CT scan        Thank you for allowing me to participate in this patients care  Please do not hesitate to call with any additional questions    Carlos Manuel Tovar MD

## 2019-11-24 NOTE — PROGRESS NOTES
Vancomycin IV Pharmacy-to-Dose Consultation    Sally Biswas is a 47 y o  male who is currently receiving Vancomycin IV with management by the Pharmacy Consult service  Assessment/Plan:  The patient was reviewed  Renal function is stable and no signs or symptoms of nephrotoxicity and/or infusion reactions were documented in the chart  Based on todays assessment, continue current vancomycin (day # 2) dosing of 1750mg q12h, with a plan for trough to be drawn at 0500 on 11/25  We will continue to follow the patients culture results and clinical progress daily      Chivo Schmid, Pharmacist

## 2019-11-24 NOTE — PLAN OF CARE
Problem: INFECTION - ADULT  Goal: Absence or prevention of progression during hospitalization  Description  INTERVENTIONS:  - Assess and monitor for signs and symptoms of infection  - Monitor lab/diagnostic results  - Monitor all insertion sites, i e  indwelling lines, tubes, and drains  - Monitor endotracheal if appropriate and nasal secretions for changes in amount and color  - Mcgrew appropriate cooling/warming therapies per order  - Administer medications as ordered  - Instruct and encourage patient and family to use good hand hygiene technique  - Identify and instruct in appropriate isolation precautions for identified infection/condition  Outcome: Progressing     Problem: SAFETY ADULT  Goal: Patient will remain free of falls  Description  INTERVENTIONS:  - Assess patient frequently for physical needs  -  Identify cognitive and physical deficits and behaviors that affect risk of falls    -  Mcgrew fall precautions as indicated by assessment   - Educate patient/family on patient safety including physical limitations  - Instruct patient to call for assistance with activity based on assessment  - Modify environment to reduce risk of injury  - Consider OT/PT consult to assist with strengthening/mobility  Outcome: Progressing     Problem: SAFETY ADULT  Goal: Maintain or return to baseline ADL function  Description  INTERVENTIONS:  -  Assess patient's ability to carry out ADLs; assess patient's baseline for ADL function and identify physical deficits which impact ability to perform ADLs (bathing, care of mouth/teeth, toileting, grooming, dressing, etc )  - Assess/evaluate cause of self-care deficits   - Assess range of motion  - Assess patient's mobility; develop plan if impaired  - Assess patient's need for assistive devices and provide as appropriate  - Encourage maximum independence but intervene and supervise when necessary  - Involve family in performance of ADLs  - Assess for home care needs following discharge   - Consider OT consult to assist with ADL evaluation and planning for discharge  - Provide patient education as appropriate  Outcome: Progressing     Problem: METABOLIC, FLUID AND ELECTROLYTES - ADULT  Goal: Fluid balance maintained  Description  INTERVENTIONS:  - Monitor labs   - Monitor I/O and WT  - Instruct patient on fluid and nutrition as appropriate  - Assess for signs & symptoms of volume excess or deficit  Outcome: Progressing     Problem: METABOLIC, FLUID AND ELECTROLYTES - ADULT  Goal: Glucose maintained within target range  Description  INTERVENTIONS:  - Monitor Blood Glucose as ordered  - Assess for signs and symptoms of hyperglycemia and hypoglycemia  - Administer ordered medications to maintain glucose within target range  - Assess nutritional intake and initiate nutrition service referral as needed  Outcome: Progressing     Problem: Knowledge Deficit  Goal: Patient/family/caregiver demonstrates understanding of disease process, treatment plan, medications, and discharge instructions  Description  Complete learning assessment and assess knowledge base    Interventions:  - Provide teaching at level of understanding  - Provide teaching via preferred learning methods  Outcome: Progressing     Problem: SKIN/TISSUE INTEGRITY - ADULT  Goal: Skin integrity remains intact  Description  INTERVENTIONS  - Identify patients at risk for skin breakdown  - Assess and monitor skin integrity  - Assess and monitor nutrition and hydration status  - Monitor labs (i e  albumin)  - Assess for incontinence   - Turn and reposition patient  - Assist with mobility/ambulation  - Relieve pressure over bony prominences  - Avoid friction and shearing  - Provide appropriate hygiene as needed including keeping skin clean and dry  - Evaluate need for skin moisturizer/barrier cream  - Collaborate with interdisciplinary team (i e  Nutrition, Rehabilitation, etc )   - Patient/family teaching  Outcome: Progressing

## 2019-11-24 NOTE — ASSESSMENT & PLAN NOTE
· Pt reports BRBPR in the toilet and then an episode of dark stool, concerning for possible melena  · Hgb continues to be stable at 15 0, likely decrease from hemodilution from aggressive IVF hydration   · Continue IV protonix for now  · Denies any recent diarrhea or stool output, continue to monitor for any additional melanotic stools   · If persistent or significant down trend in hgb consider GI consultation   · Supportive care with IVF

## 2019-11-24 NOTE — ASSESSMENT & PLAN NOTE
· Mild, no previous blood work to compare  · Now resolved  · Likely secondary/reactive to recent viral gastroenteritis

## 2019-11-24 NOTE — PROGRESS NOTES
Progress Note - Sweta Adams 1970, 47 y o  male MRN: [id number]    Unit/Bed#: -01 Encounter: [id number]    Primary Care Provider: Yaima Allen   Date and time admitted to hospital: 11/23/2019 12:33 PM      DOS: 11/24/2019    * Severe sepsis ([facility] )  Assessment & Plan  · POA, as evidenced by tachycardia, leukocytosis, lactic acidosis and scrotal cellulitis on US, improving  · Pt reports history of scrotal cellulitis as outpatient, previously treated with amoxicillin with resolution   · Continue IVF hydration   · IV Zosyn and vancomycin for anaerobic coverage for now as improvement is noted  · Urology consultation pending  · HgbA1c significantly elevated at 11 9%, likely etiology for recurrent scrotal infections   · Blood cultures pending  · UA negative   · Supportive care with PRN tylenol, pt denies any severe pain currently   · Continue to trend temps/WBC closely     Diabetes mellitus with hyperglycemia ([facility] )  Assessment & Plan  · Newly diagnosed diabetes mellitus with hgbA1c 11 9%  · Start patient on low dose long acting Lantus 10 units QHS and 5 units humalog TID AC with SSI coverage based on TDD, pt is insulin naive and will start low  · Monitor QID glucose checks  · Continue consistent carb diet, nutrition consultation   · Pt also reports neuropathic pain, will trial gabapentin 100 mg BID and titrate as needed  · Attempt to obtain records from [place] [address]  as patient reports he recently had blood work there in May when he had a previous cellulitic infection   · Likely etiology for recurrent scrotal infections     Melena  Assessment & Plan  · Pt reports BRBPR in the toilet and then an episode of dark stool, concerning for possible melena  · Hgb continues to be stable at 15 0, likely decrease from hemodilution from aggressive IVF hydration   · Continue IV protonix for now  · Denies any recent diarrhea or stool output, continue to monitor for any additional melanotic stools   · If persistent or significant down trend in hgb consider GI consultation   · Supportive care with IVF    Gastroenteritis  Assessment & Plan  · Pt reported multiple episodes of diarrhea s/p vacation in [facility]   · Lasted for 2 days and then resolved, no additional diarrhea noted here  · Likely viral etiology, self limiting   · Denies any nausea/vomiting  · Continue to monitor stool output  · Supportive care with IVF hydration     AYLA (obstructive sleep apnea)  Assessment & Plan  · Pt requires Bipap at night  · Continue here    Cellulitis of groin  Assessment & Plan  · X 24 hours, pt reports edema and warmth, denies any significant pain   · Scrotal US with scrotal wall thickening, edema and inflammation, testes appear normal  · Pt reports recurrent episodes, previously treated with amoxicillin outpatient with resolution   · Underlying uncontrolled diabetes likely cause of recurrent infections   · Urology consultation pending  · IV Zosyn and vancomycin for now, noted improvement, narrow as able    Transaminitis-resolved as of 11/24/2019  Assessment & Plan  · Mild, no previous blood work to compare  · Now resolved  · Likely secondary/reactive to recent viral gastroenteritis       VTE Pharmacologic Prophylaxis:   Pharmacologic: Enoxaparin (Lovenox)  Mechanical VTE Prophylaxis in Place: Yes    Patient Centered Rounds: I have performed bedside rounds with nursing staff today  Discussions with Specialists or Other Care Team Provider: Discussed with RN, KATHLEEN and reviewed previous notes     Education and Discussions with Family / Patient: Discussed with patient at bedside regarding plan of care, will discuss with patient's wife later today when she comes back to visit the patient  Time Spent for Care: 45 minutes  More than 50% of total time spent on counseling and coordination of care as described above      Current Length of Stay: 1 day(s)    Current Patient Status: Inpatient   Certification Statement: The patient will continue to require additional inpatient hospital stay due to monitoring glucose with starting insulin, IV abx for scrotal cellulitis, urology consultation    Discharge Plan: Not medically stable as above, likely not for the next 48 hours pending improvement and continuation    Code Status: Level 1 - Full Code    Subjective:   Pt reports that he is shocked regarding his A1c  States that he had no idea that his sugars were so high  Reports that he does follow up with a PCP and urology at [facility] and had blood work done but was never told he had high blood sugars  Currently denies any pain but does report discomfort from all of the swelling  Denies any chest pain, shortness of breath, abdominal pain, nausea, vomiting  Reports continued sensitivities in his feet and neuropathy, is agreeable to trialing gabapentin  Objective:     Vitals:   Temp (24hrs), Av 7 °F (37 1 °C), Min:98 2 °F (36 8 °C), Max:99 1 °F (37 3 °C)    Temp:  [98 2 °F (36 8 °C)-99 1 °F (37 3 °C)] 98 4 °F (36 9 °C)  HR:  [] 79  Resp:  [17-20] 18  BP: (103-129)/(64-84) 129/84  SpO2:  [96 %-99 %] 98 %  Body mass index is 30 83 kg/m²  Input and Output Summary (last 24 hours): Intake/Output Summary (Last 24 hours) at 2019 1609  Last data filed at 2019 0829  Gross per 24 hour   Intake 1460 ml   Output 250 ml   Net 1210 ml       Physical Exam:     Physical Exam   Constitutional: No distress  Pt is in no acute distress sitting in his hospital chair resting comfortably  Pleasant and cooperative  HENT:   Head: Normocephalic and atraumatic  Eyes: Pupils are equal, round, and reactive to light  Conjunctivae are normal    Cardiovascular: Normal rate, regular rhythm and intact distal pulses  Pulmonary/Chest: Effort normal and breath sounds normal  No stridor  No respiratory distress  He has no wheezes  Abdominal: Soft  Bowel sounds are normal  He exhibits no distension  There is no tenderness  There is no guarding  Genitourinary:   Genitourinary Comments: Exam performed with nursing staff present in room  Continued erythema and swelling of the scrotum with retracted penis  Slight improvement of surrounding erythema compared to yesterday   Neurological: He is alert  Skin: Skin is warm and dry  He is not diaphoretic  There is erythema  Psychiatric: He has a normal mood and affect  Vitals reviewed  Additional Data:     Labs:    Results from last 7 days   Lab Units 11/24/19  0434  11/23/19  1350   WBC Thousand/uL 12 91*  --  20 15*   HEMOGLOBIN g/dL 15 0  --  16 6   HEMATOCRIT % 43 6  --  47 1   PLATELETS Thousands/uL 188   < > 200   NEUTROS PCT %  --   --  89*   LYMPHS PCT %  --   --  5*   MONOS PCT %  --   --  6   EOS PCT %  --   --  0    < > = values in this interval not displayed  Results from last 7 days   Lab Units 11/24/19  0434   POTASSIUM mmol/L 3 7   CHLORIDE mmol/L 101   CO2 mmol/L 22   BUN mg/dL 13   CREATININE mg/dL 1 02   CALCIUM mg/dL 8 5   ALK PHOS U/L 69   ALT U/L 73   AST U/L 25     Results from last 7 days   Lab Units 11/23/19  1350   INR  1 01       * I Have Reviewed All Lab Data Listed Above  * Additional Pertinent Lab Tests Reviewed: All Labs Within Last 24 Hours Reviewed    Imaging:    Imaging Reports Reviewed Today Include: US scrotum   Imaging Personally Reviewed by Myself Includes:  None    Recent Cultures (last 7 days):     Results from last 7 days   Lab Units 11/23/19  1554 11/23/19  1502   BLOOD CULTURE  Received in Microbiology Lab  Culture in Progress  Received in Microbiology Lab  Culture in Progress         Last 24 Hours Medication List:     Current Facility-Administered Medications:  acetaminophen 650 mg Oral Q6H PRN Cristian Lucas PA-C    enoxaparin 40 mg Subcutaneous Daily Sonia Morocho PA-C    gabapentin 100 mg Oral BID Sonia Morocho PA-C    insulin glargine 10 Units Subcutaneous HS Sonia Morocho PA-C    insulin lispro 1-5 Units Subcutaneous TID AC Cristian Lucas DAVE    insulin lispro 1-5 Units Subcutaneous HS Sonia Morocho PA-C    insulin lispro 5 Units Subcutaneous TID With Meals Baljit Malhotra PA-C    pantoprazole 40 mg Intravenous Q12H [address] Sonia Morocho PA-C    piperacillin-tazobactam 3 375 g Intravenous Q6H Baljit Malhotra PA-C Last Rate: 3 375 g (11/24/19 1129)   sodium chloride 125 mL/hr Intravenous Continuous Sonia Morocho PA-C Last Rate: 125 mL/hr (11/24/19 0529)   vancomycin 20 mg/kg (Adjusted) Intravenous Q12H Jyoti Hernandez MD Last Rate: 1,750 mg (11/24/19 2064)        Today, Patient Was Seen By: Baljit Malhotra PA-C    ** Please Note: Dictation voice to text software may have been used in the creation of this document   **

## 2019-11-24 NOTE — ASSESSMENT & PLAN NOTE
· Pt reported multiple episodes of diarrhea s/p vacation in [facility]   · Lasted for 2 days and then resolved, no additional diarrhea noted here  · Likely viral etiology, self limiting   · Denies any nausea/vomiting  · Continue to monitor stool output  · Supportive care with IVF hydration

## 2019-11-24 NOTE — UTILIZATION REVIEW
Initial Clinical Review    Admission: Date/Time/Statement: Inpatient Admission Orders (From admission, onward)     Ordered        11/23/19 1559  Inpatient Admission  Once                   Orders Placed This Encounter   Procedures    Inpatient Admission     Standing Status:   Standing     Number of Occurrences:   1     Order Specific Question:   Admitting Physician     Answer:   Blaze Vasquez [[id number]]     Order Specific Question:   Level of Care     Answer:   Med Surg [[id number]]     Order Specific Question:   Estimated length of stay     Answer:   More than 2 Midnights     Order Specific Question:   Certification     Answer:   I certify that inpatient services are medically necessary for this patient for a duration of greater than two midnights  See H&P and MD Progress Notes for additional information about the patient's course of treatment  ED Arrival Information     Expected Arrival Acuity Means of Arrival Escorted By Service Admission Type    - 11/23/2019 12:26 Urgent Walk-In Spouse General Medicine Urgent    Arrival Complaint    [address], [address] [facility]        Chief Complaint   Patient presents with    Diarrhea     Patient c/o diarrhea that started on Tuesday and continued into Wednesday which then subsided  Patient states today he had sudden onset of shivering that started at home today  Patient is also c/o abdominal tenderness and pelvic tenderness  Patient states he recently traveled to [facility] and got home last Saturday  Assessment/Plan: 46 yo male to ED from home w/ recurrent scrotal cellulitis , edema and warmth x1 day   Multiple episodes of diarrhea a few days after returning from [facility] on vacation   BRB in toilet bowl x1  Admitted IP status w/ severe sepsis and scrotal cellulitis   Plan to treat w/ iv zosyn and vanco , urology consult and f/u ua and bld cx  monitor temps and wbc  Palced on iv protonix for epigastric discomfort and consider GI consult      ED Triage Vitals   Temperature Pulse Respirations Blood Pressure SpO2   11/23/19 1238 11/23/19 1238 11/23/19 1238 11/23/19 1238 11/23/19 1238   98 8 °F (37 1 °C) (!) 115 20 (!) 180/87 97 %      Temp Source Heart Rate Source Patient Position - Orthostatic VS BP Location FiO2 (%)   11/23/19 1238 11/23/19 1238 11/23/19 1238 11/23/19 1238 --   Oral Monitor Lying Right arm       Pain Score       11/23/19 1338       2        Wt Readings from Last 1 Encounters:   11/23/19 106 kg (233 lb 11 oz)     Additional Vital Signs:   11/24/19 07:24:52  98 2 °F (36 8 °C)  86  18  125/78  94  96 %  --  --   11/24/19 0400  --  --  --  --  --  98 %  --  --   11/23/19 2334  --  --  --  --  --  99 %  None (Room air)  --   11/23/19 23:16:32  99 1 °F (37 3 °C)  101  17  124/74  91  96 %  --  --   11/23/19 16:52:11  99 °F (37 2 °C)  113Abnormal   18  122/76  91  96 %  --  --   11/23/19 1623  --  116Abnormal   20  103/64  --  97 %  None (Room air)  Sitting   11/23/19 1338  --  120Abnormal   16  120/57  --  96 %  None (Room air         Pertinent Labs/Diagnostic Test Results:   US scrotum and testes-   1  Scrotal wall thickening, edema, and inflammation  2  Normal testes         11/23 CT abd -   1  Colonic diverticulosis without diverticulitis or other acute abdominopelvic findings        2   Mild nonspecific swelling in the subcutaneous tissues of the bilateral inguinal region    Correlate with direct inspection for evidence of cellulitis         Results from last 7 days   Lab Units 11/24/19  0434 11/23/19  1921 11/23/19  1350   WBC Thousand/uL 12 91*  --  20 15*   HEMOGLOBIN g/dL 15 0  --  16 6   HEMATOCRIT % 43 6  --  47 1   PLATELETS Thousands/uL 188 204 200   NEUTROS ABS Thousands/µL  --   --  17 85*     Results from last 7 days   Lab Units 11/24/19  0434 11/23/19  1350   SODIUM mmol/L 136 136   POTASSIUM mmol/L 3 7 3 8   CHLORIDE mmol/L 101 98*   CO2 mmol/L 22 24   ANION GAP mmol/L 13 14*   BUN mg/dL 13 13   CREATININE mg/dL 1 02 1 06   EGFR ml/min/1 73sq m 85 81 CALCIUM mg/dL 8 5 9 1     Results from last 7 days   Lab Units 11/24/19  0434 11/23/19  1350   AST U/L 25 49*   ALT U/L 73 108*   ALK PHOS U/L 69 86   TOTAL PROTEIN g/dL 6 3* 7 2   ALBUMIN g/dL 3 3* 4 1   TOTAL BILIRUBIN mg/dL 1 20* 0 80     Results from last 7 days   Lab Units 11/24/19  0524 11/23/19  2052   POC GLUCOSE mg/dl 292* 333*     Results from last 7 days   Lab Units 11/24/19  0434 11/23/19  1350   GLUCOSE RANDOM mg/dL 288* 312*     Results from last 7 days   Lab Units 11/23/19  1350   HEMOGLOBIN A1C % 11 9*   EAG mg/dl 295     Results from last 7 days   Lab Units 11/23/19  1350   PROTIME seconds 13 3   INR  1 01   PTT seconds 28     Results from last 7 days   Lab Units 11/23/19  1555 11/23/19  1350   LACTIC ACID mmol/L 2 0 3 1*     Results from last 7 days   Lab Units 11/23/19  1350   LIPASE u/L 326     Results from last 7 days   Lab Units 11/23/19  1509   CLARITY UA  Clear   COLOR UA  Yellow   SPEC GRAV UA  1 025   PH UA  5 0   GLUCOSE UA mg/dl >=1000 (1%)*   KETONES UA mg/dl Negative   BLOOD UA  Negative   PROTEIN UA mg/dl Negative   NITRITE UA  Negative   BILIRUBIN UA  Negative   UROBILINOGEN UA E U /dl 0 2   LEUKOCYTES UA  Elevated glucose may cause decreased leukocyte values  See urine microscopic for [facility] result/*   WBC UA /hpf 2-4*   RBC UA /hpf 1-2*   BACTERIA UA /hpf None Seen   EPITHELIAL CELLS WET PREP /hpf None Seen     Results from last 7 days   Lab Units 11/23/19  1554 11/23/19  1502   BLOOD CULTURE  Received in Microbiology Lab  Culture in Progress  Received in Microbiology Lab  Culture in Progress       ED Treatment:   Medication Administration from 11/23/2019 1226 to 11/23/2019 1643       Date/Time Order Dose Route Action     11/23/2019 1340 sodium chloride 0 9 % bolus 1,000 mL 1,000 mL Intravenous New Bag     11/23/2019 1351 ketorolac (TORADOL) injection 15 mg 15 mg Intravenous Given     11/23/2019 1445 sodium chloride 0 9 % bolus 1,000 mL 1,000 mL Intravenous New Bag     11/23/2019 1513 ceftriaxone (ROCEPHIN) 1 g/50 mL in dextrose IVPB 1,000 mg Intravenous New Bag        Past Medical History:   Diagnosis Date    AYLA treated with BiPAP      Present on Admission:  **None**      Admitting Diagnosis: Diarrhea [R19 7]  Cellulitis of groin [L03 314]  Severe sepsis (HCC) [A41 9, R65 20]  Age/Sex: 47 y o  male  Admission Orders:  Scheduled Medications:    Medications:  enoxaparin 40 mg Subcutaneous Daily   insulin lispro 1-5 Units Subcutaneous TID AC   insulin lispro 1-5 Units Subcutaneous HS   pantoprazole 40 mg Intravenous Q12H [facility]   piperacillin-tazobactam 3 375 g Intravenous Q6H   vancomycin 20 mg/kg (Adjusted) Intravenous Q12H     Continuous IV Infusions:    sodium chloride 125 mL/hr Intravenous Continuous     PRN Meds:    acetaminophen 650 mg Oral Q6H PRN     Fingerstick ac and hs   Cons carb diet   Up and OOB as hunter     IP CONSULT TO UROLOGY  IP CONSULT TO PHARMACY    Network Utilization Review Department  [email or web address]  ATTENTION: Please call with any questions or concerns to [phone] and carefully listen to the prompts so that you are directed to the right person  All voicemails are confidential   Yahaira Monaco all requests for admission clinical reviews, approved or denied determinations and any other requests to dedicated fax number below belonging to the campus where the patient is receiving treatment    FACILITY NAME UR FAX NUMBER   ADMISSION DENIALS (Administrative/Medical Necessity) [address] [facility] (Maternity/NICU/Pediatrics) [phone]   [facility] [address] [place] [phone]   [facility] [address] [facility] [phone]   Galina Luna [address] [phone] (4) no impairment

## 2019-11-24 NOTE — ASSESSMENT & PLAN NOTE
· X 24 hours, pt reports edema and warmth, denies any significant pain   · Scrotal US with scrotal wall thickening, edema and inflammation, testes appear normal  · Pt reports recurrent episodes, previously treated with amoxicillin outpatient with resolution   · Underlying uncontrolled diabetes likely cause of recurrent infections   · Urology consultation pending  · IV Zosyn and vancomycin for now, noted improvement, narrow as able

## 2019-11-24 NOTE — UTILIZATION REVIEW
Notification of Inpatient Admission/Inpatient Authorization Request   This is a Notification of Inpatient Admission for [address]  Be advised that this patient was admitted to our facility under Inpatient Status  Contact Pedrito Gonzalez at [phone] for additional admission information  Alvin IQBAL DEPT DEDICATED Anainelson Logan [phone]  Patient Name:   Juany Nelson   YOB: 1970       [address]   [address]:   [address]   Tax ID: [id number]  NPI: [id number] Attending Provider/NPI: Ulices Bustillo Md [[id number]]   Attending Physician:  Ulices Bustillo MD MPH  Specialty- [facility] ID- [id number]  [address] [place] [address]  Phone 1: [phone]  Fax: [phone]   Place of Service Code: 24     Place of Service Name:  [address] [place] [address]   Start Date: 11/23/19 1559     Discharge Date & Time: No discharge date for patient encounter  Type of Admission: Inpatient Status Discharge Disposition   (if discharged): Final discharge disposition not confirmed   Patient Diagnoses: Diarrhea [R19 7]  Cellulitis of groin [I78 700]  Severe sepsis ([facility] [address] ) [A41 9, R65 20]     Orders: Admission Orders (From admission, onward)     Ordered        11/23/19 1559  Inpatient Admission  Once                    Assigned Utilization Review Contact: Major Bye  Utilization   Network Utilization Review Department  Phone: [phone]; Fax [phone]  Email: [email or web address]   ATTENTION PAYERS: Please call the assigned Utilization  directly with any questions or concerns ALL voicemails in the department are confidential  Send all requests for admission clinical reviews, approved or denied determinations and any other requests to dedicated fax number belonging to the campus where the patient is receiving treatment

## 2019-11-25 LAB
ALBUMIN SERPL BCP-MCNC: 2.6 G/DL (ref 3.5–5)
ALP SERPL-CCNC: 51 U/L (ref 46–116)
ALT SERPL W P-5'-P-CCNC: 48 U/L (ref 12–78)
ANION GAP SERPL CALCULATED.3IONS-SCNC: 10 MMOL/L (ref 4–13)
AST SERPL W P-5'-P-CCNC: 14 U/L (ref 5–45)
BILIRUB SERPL-MCNC: 0.4 MG/DL (ref 0.2–1)
BUN SERPL-MCNC: 9 MG/DL (ref 5–25)
CALCIUM SERPL-MCNC: 7.6 MG/DL (ref 8.3–10.1)
CHLORIDE SERPL-SCNC: 109 MMOL/L (ref 100–108)
CO2 SERPL-SCNC: 22 MMOL/L (ref 21–32)
CREAT SERPL-MCNC: 0.76 MG/DL (ref 0.6–1.3)
ERYTHROCYTE [DISTWIDTH] IN BLOOD BY AUTOMATED COUNT: 12.5 % (ref 11.6–15.1)
GFR SERPL CREATININE-BSD FRML MDRD: 106 ML/MIN/1.73SQ M
GLUCOSE SERPL-MCNC: 166 MG/DL (ref 65–140)
GLUCOSE SERPL-MCNC: 166 MG/DL (ref 65–140)
GLUCOSE SERPL-MCNC: 224 MG/DL (ref 65–140)
GLUCOSE SERPL-MCNC: 225 MG/DL (ref 65–140)
GLUCOSE SERPL-MCNC: 254 MG/DL (ref 65–140)
HCT VFR BLD AUTO: 39.2 % (ref 36.5–49.3)
HCT VFR BLD AUTO: 44.3 % (ref 36.5–49.3)
HGB BLD-MCNC: 13.4 G/DL (ref 12–17)
HGB BLD-MCNC: 15.3 G/DL (ref 12–17)
MCH RBC QN AUTO: 31 PG (ref 26.8–34.3)
MCHC RBC AUTO-ENTMCNC: 34.2 G/DL (ref 31.4–37.4)
MCV RBC AUTO: 91 FL (ref 82–98)
PLATELET # BLD AUTO: 164 THOUSANDS/UL (ref 149–390)
PMV BLD AUTO: 9.8 FL (ref 8.9–12.7)
POTASSIUM SERPL-SCNC: 3.4 MMOL/L (ref 3.5–5.3)
PROT SERPL-MCNC: 5.1 G/DL (ref 6.4–8.2)
RBC # BLD AUTO: 4.32 MILLION/UL (ref 3.88–5.62)
SODIUM SERPL-SCNC: 141 MMOL/L (ref 136–145)
WBC # BLD AUTO: 9 THOUSAND/UL (ref 4.31–10.16)

## 2019-11-25 PROCEDURE — 99232 SBSQ HOSP IP/OBS MODERATE 35: CPT | Performed by: PHYSICIAN ASSISTANT

## 2019-11-25 PROCEDURE — 99223 1ST HOSP IP/OBS HIGH 75: CPT | Performed by: INTERNAL MEDICINE

## 2019-11-25 PROCEDURE — 85014 HEMATOCRIT: CPT | Performed by: PHYSICIAN ASSISTANT

## 2019-11-25 PROCEDURE — 94760 N-INVAS EAR/PLS OXIMETRY 1: CPT

## 2019-11-25 PROCEDURE — 85018 HEMOGLOBIN: CPT | Performed by: PHYSICIAN ASSISTANT

## 2019-11-25 PROCEDURE — 94660 CPAP INITIATION&MGMT: CPT

## 2019-11-25 PROCEDURE — 85027 COMPLETE CBC AUTOMATED: CPT | Performed by: PHYSICIAN ASSISTANT

## 2019-11-25 PROCEDURE — 80053 COMPREHEN METABOLIC PANEL: CPT | Performed by: PHYSICIAN ASSISTANT

## 2019-11-25 PROCEDURE — 82948 REAGENT STRIP/BLOOD GLUCOSE: CPT

## 2019-11-25 RX ORDER — POTASSIUM CHLORIDE 20 MEQ/1
40 TABLET, EXTENDED RELEASE ORAL ONCE
Status: COMPLETED | OUTPATIENT
Start: 2019-11-25 | End: 2019-11-25

## 2019-11-25 RX ORDER — CEFAZOLIN SODIUM 2 G/50ML
2000 SOLUTION INTRAVENOUS EVERY 8 HOURS
Status: DISCONTINUED | OUTPATIENT
Start: 2019-11-25 | End: 2019-11-28

## 2019-11-25 RX ORDER — FUROSEMIDE 10 MG/ML
20 INJECTION INTRAMUSCULAR; INTRAVENOUS DAILY
Status: DISCONTINUED | OUTPATIENT
Start: 2019-11-25 | End: 2019-11-28 | Stop reason: HOSPADM

## 2019-11-25 RX ORDER — CLOTRIMAZOLE 1 %
CREAM (GRAM) TOPICAL 2 TIMES DAILY
Status: DISCONTINUED | OUTPATIENT
Start: 2019-11-25 | End: 2019-11-28 | Stop reason: HOSPADM

## 2019-11-25 RX ORDER — SACCHAROMYCES BOULARDII 250 MG
250 CAPSULE ORAL 2 TIMES DAILY
Status: DISCONTINUED | OUTPATIENT
Start: 2019-11-25 | End: 2019-11-28 | Stop reason: HOSPADM

## 2019-11-25 RX ORDER — INSULIN GLARGINE 100 [IU]/ML
20 INJECTION, SOLUTION SUBCUTANEOUS
Status: DISCONTINUED | OUTPATIENT
Start: 2019-11-25 | End: 2019-11-27

## 2019-11-25 RX ADMIN — CEFAZOLIN SODIUM 2000 MG: 2 SOLUTION INTRAVENOUS at 13:31

## 2019-11-25 RX ADMIN — SODIUM CHLORIDE 125 ML/HR: 0.9 INJECTION, SOLUTION INTRAVENOUS at 13:31

## 2019-11-25 RX ADMIN — PANTOPRAZOLE SODIUM 40 MG: 40 TABLET, DELAYED RELEASE ORAL at 08:13

## 2019-11-25 RX ADMIN — SODIUM CHLORIDE 125 ML/HR: 0.9 INJECTION, SOLUTION INTRAVENOUS at 04:36

## 2019-11-25 RX ADMIN — CLOTRIMAZOLE: 1 CREAM TOPICAL at 13:31

## 2019-11-25 RX ADMIN — CEFTRIAXONE SODIUM 1000 MG: 10 INJECTION, POWDER, FOR SOLUTION INTRAVENOUS at 06:42

## 2019-11-25 RX ADMIN — METRONIDAZOLE 500 MG: 500 TABLET ORAL at 06:42

## 2019-11-25 RX ADMIN — ENOXAPARIN SODIUM 40 MG: 40 INJECTION SUBCUTANEOUS at 08:12

## 2019-11-25 RX ADMIN — POTASSIUM CHLORIDE 40 MEQ: 1500 TABLET, EXTENDED RELEASE ORAL at 10:42

## 2019-11-25 RX ADMIN — INSULIN LISPRO 1 UNITS: 100 INJECTION, SOLUTION INTRAVENOUS; SUBCUTANEOUS at 21:18

## 2019-11-25 RX ADMIN — Medication 250 MG: at 17:08

## 2019-11-25 RX ADMIN — METRONIDAZOLE 500 MG: 500 TABLET ORAL at 21:18

## 2019-11-25 RX ADMIN — CLOTRIMAZOLE: 1 CREAM TOPICAL at 17:01

## 2019-11-25 RX ADMIN — METRONIDAZOLE 500 MG: 500 TABLET ORAL at 14:54

## 2019-11-25 RX ADMIN — GABAPENTIN 100 MG: 100 CAPSULE ORAL at 08:13

## 2019-11-25 RX ADMIN — INSULIN GLARGINE 20 UNITS: 100 INJECTION, SOLUTION SUBCUTANEOUS at 21:18

## 2019-11-25 RX ADMIN — PANTOPRAZOLE SODIUM 40 MG: 40 TABLET, DELAYED RELEASE ORAL at 17:00

## 2019-11-25 RX ADMIN — FUROSEMIDE 20 MG: 10 INJECTION, SOLUTION INTRAMUSCULAR; INTRAVENOUS at 13:31

## 2019-11-25 RX ADMIN — INSULIN LISPRO 2 UNITS: 100 INJECTION, SOLUTION INTRAVENOUS; SUBCUTANEOUS at 08:15

## 2019-11-25 RX ADMIN — INSULIN LISPRO 5 UNITS: 100 INJECTION, SOLUTION INTRAVENOUS; SUBCUTANEOUS at 08:16

## 2019-11-25 RX ADMIN — GABAPENTIN 100 MG: 100 CAPSULE ORAL at 17:00

## 2019-11-25 RX ADMIN — INSULIN LISPRO 2 UNITS: 100 INJECTION, SOLUTION INTRAVENOUS; SUBCUTANEOUS at 17:01

## 2019-11-25 RX ADMIN — INSULIN LISPRO 3 UNITS: 100 INJECTION, SOLUTION INTRAVENOUS; SUBCUTANEOUS at 12:29

## 2019-11-25 RX ADMIN — CEFAZOLIN SODIUM 2000 MG: 2 SOLUTION INTRAVENOUS at 21:17

## 2019-11-25 NOTE — ASSESSMENT & PLAN NOTE
· Newly diagnosed diabetes mellitus with hgbA1c 11 9%, likely type 2  · Increase Lantus to 20 units QHS, scheduled humalog to 8 units TID and increase SSI algorithm coverage  · Glucose slowly improving, continues to be mid 200s, monitor with adjustments  · Consider endocrinology consultation if no improvement noted on increased insulin coverage  · Will need endocrine follow up outpatient and insulin supplies on discharge  · Monitor QID glucose checks  · Continue consistent carb diet, nutrition consultation   · Pt also reports neuropathic pain, will trial gabapentin 100 mg BID and titrate as needed  · Attempt to obtain records from [employer] as patient reports he recently had blood work there in May when he had a previous cellulitic infection   · Likely etiology for recurrent scrotal infections

## 2019-11-25 NOTE — SOCIAL WORK
CM name and role reviewed  Discharge Checklist reviewed and CM will continue to monitor for progress toward discharge goals in nursing and provider rounds  CM met with pt  Pt alert and sitting in chair  Pt lives with his wife  He is independent and works  Pt has no hx of VNA or SNF  CM discussed VNA for a few days prior to returning back to works as he is a new diabetic  Pt stated that he does not feel the need for it as he has support from his wife  Pt stated that he will be able to manage with his wife's help  CM offered to schedule an endocrinologist and PCP appt  Pt agreeable to any date and time  He said that he will make it work in his schedule  Pt uses [facility] in [facility]  He said that he has no issues with copayments  CM informed him that CM will work closely with physician to make sure all meds are established and affordable prior to discharge  Pt drives  His wife will provide transportation upon discharge  CM scheduled PCP appt with Aurelia Goel on 12/3/19 at 9:15 am  CM scheduled endocrinologist appt at Dr Langford [facility] office on 7/3/79  AV updated  CM reviewed discharge planning process including the following: identifying help at home, patient preference for discharge planning needs, pharmacy preference, and availability of treatment team to discuss questions or concerns patient and/or family may have regarding understanding medications and recognizing signs and symptoms once discharged  CM also encouraged patient to follow up with all recommended appointments after discharge  Patient advised of importance for patient and family to participate in managing patients medical well being

## 2019-11-25 NOTE — RESPIRATORY THERAPY NOTE
11/24/19 3045   Non-Invasive Information   Interface Face mask   Non-Invasive Ventilation Mode BiPAP   SpO2 98 %   $ Pulse Oximetry Spot Check Charge Completed   Resp Comments placed patient on bipap at this time for the night  tolerating well     Non-Invasive Settings   IPAP (cm) 17 cm   EPAP (cm) 14 cm   Rate (Set) 10   FiO2 (%) 21   Rise Time 3   Inspiratory Time (Set) 1   Non-Invasive Readings   Total Rate 21   MV (Mech) 12 3   Peak Pressure (Obs) 16 5   Spontaneous Vt (mL) 620   I/E Ratio (Obs) 1:3 1   Leak (lpm) 50   Skin Intervention Skin intact   Non-Invasive Alarms   Vt Low (mL) 4   Apnea Interval (sec) 20

## 2019-11-25 NOTE — ASSESSMENT & PLAN NOTE
· Pt reported multiple episodes of diarrhea s/p vacation in [facility]   · Lasted for 2 days and then resolved  · Reports diarrhea today, likely in setting of antibiotic therapy, monitor stool output closely  · Add probiotic  · ID following  · Denies any nausea/vomiting  · Continue to monitor stool output  · Supportive care with IVF hydration

## 2019-11-25 NOTE — ASSESSMENT & PLAN NOTE
· Pt reports BRBPR in the toilet and then an episode of dark stool, concerning for possible melena  · Hgb down trended to 13 4, likely decrease from hemodilution from aggressive IVF hydration, however patient reports additional episode of black stool today   · On PPI BID   · Obtain repeat H&H this afternoon, if continued down trend will obtain GI consultation   · Probiotic for diarrhea s/p abx therapy   · Supportive care with IVF

## 2019-11-25 NOTE — PROGRESS NOTES
Progress Note - Lamin Pickering 1970, 47 y o  male MRN: [id number]    Unit/Bed#: -01 Encounter: [id number]    Primary Care Provider: Rajiv Serrano   Date and time admitted to hospital: 11/23/2019 12:33 PM        * Severe sepsis [facility])  Assessment & Plan  · POA, as evidenced by tachycardia, leukocytosis, lactic acidosis and scrotal cellulitis on US, improving  · Pt reports history of scrotal cellulitis as outpatient, previously treated with amoxicillin with resolution   · Continue IVF hydration   · ID following,  · Transitioned to IV ancef and flagyl, continue, possible transition to PO in 24-48 hours  · Topical clotrimazole cream BID  · HgbA1c significantly elevated at 11 9%, likely etiology for recurrent scrotal infections   · Blood cultures x 24 hours negative  · UA negative   · Supportive care with PRN tylenol, pt denies any severe pain currently   · Continue to trend temps/WBC closely     Diabetes mellitus with hyperglycemia (HCC)  Assessment & Plan  · Newly diagnosed diabetes mellitus with hgbA1c 11 9%, likely type 2  · Increase Lantus to 20 units QHS, scheduled humalog to 8 units TID and increase SSI algorithm coverage  · Glucose slowly improving, continues to be mid 200s, monitor with adjustments  · Consider endocrinology consultation if no improvement noted on increased insulin coverage  · Will need endocrine follow up outpatient and insulin supplies on discharge  · Monitor QID glucose checks  · Continue consistent carb diet, nutrition consultation   · Pt also reports neuropathic pain, will trial gabapentin 100 mg BID and titrate as needed  · Attempt to obtain records from [place] [address]  as patient reports he recently had blood work there in May when he had a previous cellulitic infection   · Likely etiology for recurrent scrotal infections     Melena  Assessment & Plan  · Pt reports BRBPR in the toilet and then an episode of dark stool, concerning for possible melena  · Hgb down trended to 13 4, likely decrease from hemodilution from aggressive IVF hydration, however patient reports additional episode of black stool today   · On PPI BID   · Obtain repeat H&H this afternoon, if continued down trend will obtain GI consultation   · Probiotic for diarrhea s/p abx therapy   · Supportive care with IVF    Gastroenteritis  Assessment & Plan  · Pt reported multiple episodes of diarrhea s/p vacation in [facility]   · Lasted for 2 days and then resolved  · Reports diarrhea today, likely in setting of antibiotic therapy, monitor stool output closely  · Add probiotic  · ID following  · Denies any nausea/vomiting  · Continue to monitor stool output  · Supportive care with IVF hydration     AYLA (obstructive sleep apnea)  Assessment & Plan  · Pt requires Bipap at night  · Continue here    Cellulitis of groin  Assessment & Plan  · Pt reports edema and warmth, denies any significant pain   · Scrotal US with scrotal wall thickening, edema and inflammation, testes appear normal  No abscess formation noted  · Pt reports recurrent episodes, previously treated with amoxicillin outpatient with resolution   · Underlying uncontrolled diabetes likely cause of recurrent infections   · Urology following,  · Recommending scrotal support, supportive care   · Place on IV lasix 20 mg daily for scrotal edema as this appears to be persistent   · ID following,  · Transitioned to IV Ancef/Flagyl and added clotrimazole cream BID to groin and scrotum       VTE Pharmacologic Prophylaxis:   Pharmacologic: Enoxaparin (Lovenox)  Mechanical VTE Prophylaxis in Place: Yes    Patient Centered Rounds: I have performed bedside rounds with nursing staff today  Gabriel Pearl    Discussions with Specialists or Other Care Team Provider: Discussed with GI, ID, urology, RN, CM and reviewed previous notes     Education and Discussions with Family / Patient: Discussed with patient and patient's wife at bedside regarding plan of care       Time Spent for Care: 30 minutes  More than 50% of total time spent on counseling and coordination of care as described above  Current Length of Stay: 2 day(s)    Current Patient Status: Inpatient   Certification Statement: The patient will continue to require additional inpatient hospital stay due to continued IV abx, IV lasix, monitoring stool output    Discharge Plan: Not medically stable as above, likely not for at least next 48 hours pending improvement     Code Status: Level 1 - Full Code      Subjective:   Pt reports that he feels about the same today  Reports that the swelling and erythema is the same but the tenderness is a little improved  Denies any chest pain, shortness of breath, abdominal pain, nausea, vomiting  Objective:     Vitals:   Temp (24hrs), Av 5 °F (36 9 °C), Min:98 4 °F (36 9 °C), Max:98 6 °F (37 °C)    Temp:  [98 4 °F (36 9 °C)-98 6 °F (37 °C)] 98 6 °F (37 °C)  HR:  [77-85] 77  Resp:  [17-18] 17  BP: (127-129)/(82-84) 127/82  SpO2:  [96 %-98 %] 96 %  Body mass index is 30 83 kg/m²  Input and Output Summary (last 24 hours): Intake/Output Summary (Last 24 hours) at 2019 1354  Last data filed at 2019 1331  Gross per 24 hour   Intake 2540 ml   Output --   Net 2540 ml       Physical Exam:     Physical Exam   Constitutional: No distress  Pt is in no acute distress sitting in his hospital bed accompanied by his wife  Pleasant and cooperative  HENT:   Head: Normocephalic and atraumatic  Eyes: Pupils are equal, round, and reactive to light  Conjunctivae are normal    Cardiovascular: Normal rate, regular rhythm and intact distal pulses  Pulmonary/Chest: Effort normal and breath sounds normal  No stridor  No respiratory distress  He has no wheezes  Abdominal: Soft  Bowel sounds are normal  He exhibits no distension  There is no tenderness  There is no guarding     Genitourinary:   Genitourinary Comments: Continued scrotal edema and erythema sounding the scrotum and groin Musculoskeletal: He exhibits no edema  Neurological: He is alert  Skin: Skin is warm and dry  He is not diaphoretic  No erythema  Psychiatric: He has a normal mood and affect  Vitals reviewed  Additional Data:     Labs:    Results from last 7 days   Lab Units 11/25/19  0548  11/23/19  1350   WBC Thousand/uL 9 00   < > 20 15*   HEMOGLOBIN g/dL 13 4   < > 16 6   HEMATOCRIT % 39 2   < > 47 1   PLATELETS Thousands/uL 164   < > 200   NEUTROS PCT %  --   --  89*   LYMPHS PCT %  --   --  5*   MONOS PCT %  --   --  6   EOS PCT %  --   --  0    < > = values in this interval not displayed  Results from last 7 days   Lab Units 11/25/19  0548   POTASSIUM mmol/L 3 4*   CHLORIDE mmol/L 109*   CO2 mmol/L 22   BUN mg/dL 9   CREATININE mg/dL 0 76   CALCIUM mg/dL 7 6*   ALK PHOS U/L 51   ALT U/L 48   AST U/L 14     Results from last 7 days   Lab Units 11/23/19  1350   INR  1 01       * I Have Reviewed All Lab Data Listed Above  * Additional Pertinent Lab Tests Reviewed: All Labs Within Last 24 Hours Reviewed    Imaging:    Imaging Reports Reviewed Today Include: None  Imaging Personally Reviewed by Myself Includes:  None    Recent Cultures (last 7 days):     Results from last 7 days   Lab Units 11/23/19  1554 11/23/19  1502   BLOOD CULTURE  No Growth at 24 hrs  No Growth at 24 hrs         Last 24 Hours Medication List:     Current Facility-Administered Medications:  acetaminophen 650 mg Oral Q6H PRN Rivka Alexander PA-C    cefazolin 2,000 mg Intravenous Q8H Lord Wilner MD Last Rate: 2,000 mg (11/25/19 1331)   clotrimazole  Topical BID Lord Wilner MD    enoxaparin 40 mg Subcutaneous Daily Sonia Morocho PA-C    furosemide 20 mg Intravenous Daily Sonia Morocho PA-C    gabapentin 100 mg Oral BID Sonia Morocho PA-C    insulin glargine 20 Units Subcutaneous HS YANA Weiss-KHURRAM    insulin lispro 1-6 Units Subcutaneous HS Sonia Morocho PA-C    insulin lispro 2-12 Units Subcutaneous TID AC Sonia VENEGAS DAVE Morocho    insulin lispro 8 Units Subcutaneous TID With Meals Roxane Singh PA-C    metroNIDAZOLE 500 mg Oral Q8H [address] Sonia Morocho PA-C    pantoprazole 40 mg Oral BID AC Shahriar Ragsdale MD    saccharomyces boulardii 250 mg Oral BID Sonia Morocho PA-C    sodium chloride 125 mL/hr Intravenous Continuous Roxane Singh PA-C Last Rate: 125 mL/hr (11/25/19 1331)        Today, Patient Was Seen By: Roxane Singh PA-C    ** Please Note: Dictation voice to text software may have been used in the creation of this document   **

## 2019-11-25 NOTE — ASSESSMENT & PLAN NOTE
· Pt reports edema and warmth, denies any significant pain   · Scrotal US with scrotal wall thickening, edema and inflammation, testes appear normal  No abscess formation noted     · Pt reports recurrent episodes, previously treated with amoxicillin outpatient with resolution   · Underlying uncontrolled diabetes likely cause of recurrent infections   · Urology following,  · Recommending scrotal support, supportive care   · Place on IV lasix 20 mg daily for scrotal edema as this appears to be persistent   · ID following,  · Transitioned to IV Ancef/Flagyl and added clotrimazole cream BID to groin and scrotum

## 2019-11-25 NOTE — PROGRESS NOTES
The Pantoprazole has / have been converted to Oral per [facility] for Adults as approved by the Pharmacy and Therapeutics Committee  The patient met all eligible criteria:  3 Age = 25years old   2) Received at least one dose of the IV form   3) Receiving at least one other scheduled oral/enteral medication   4) Tolerating an oral/enteral diet   and did not have any exclusions:   1) Critical care patient   2) Active GI bleed (IF assessing H2RAs or PPIs)   3) Continuous tube feeding (IF assessing cipro, doxycycline, levofloxacin, minocycline, rifampin, or voriconazole)   4) Receiving PO vancomycin (IF assessing metronidazole)   5) Persistent nausea and/or vomiting   6) Ileus or gastrointestinal obstruction   7) Blanca/nasogastric tube set for continuous suction   8) Specific order not to automatically convert to PO (in the order's comments or if discussed in the most recent Infectious Disease or primary team's progress notes)  no

## 2019-11-25 NOTE — ASSESSMENT & PLAN NOTE
· POA, as evidenced by tachycardia, leukocytosis, lactic acidosis and scrotal cellulitis on US, improving  · Pt reports history of scrotal cellulitis as outpatient, previously treated with amoxicillin with resolution   · Continue IVF hydration   · ID following,  · Transitioned to IV ancef and flagyl, continue, possible transition to PO in 24-48 hours  · Topical clotrimazole cream BID  · HgbA1c significantly elevated at 11 9%, likely etiology for recurrent scrotal infections   · Blood cultures x 24 hours negative  · UA negative   · Supportive care with PRN tylenol, pt denies any severe pain currently   · Continue to trend temps/WBC closely

## 2019-11-25 NOTE — CONSULTS
Consultation - Infectious Disease   Praveen Porter 47 y o  male MRN: [id number]  Unit/Bed#: -01 Encounter: [id number]      IMPRESSION & RECOMMENDATIONS:   1  Severe sepsis-POA  Tachycardia, leukocytosis, lactic acidosis  Appears to be secondary to a scrotal cellulitis which has been recurrent in nature  He has clinically improved with decreased heart rate, and is noted to have a decreased WBC count as well as resolution of the lactic acidosis  He seems to be tolerating the antibiotics without difficulty  I suspect the infection is streptococcal although other organisms are possible in the perineum  Thus far his blood cultures remain negative   -discontinue ceftriaxone  -cefazolin 2 g IV q 8 hours  -continue Flagyl at current dose  -follow-up blood cultures  -recheck CBC with diff and creatinine   -possibly to oral antibiotics the next 24-48 hours if significant improvement noted in the cellulitis    2  Recurrent scrotal cellulitis-in the setting of newly diagnosed diabetes mellitus  Suspect this is certainly increase in the risk of there is recurrent bouts  Perhaps a component of tinea cruris is playing a role in leading to these bouts   -antibiotics as above  -clotrimazole cream twice daily to the groin and scrotum  -serial exams  -urology follow-up    3  Diabetes mellitus-type 2 with hyperglycemia  Newly diagnosed  Likely predisposing factor to the patient's recurrent cellulitis     4  Diarrhea-with possible bright red blood per rectum in the toilet an episode of dark stool  Unclear significance  Hemoglobin remains stable  Seems to have resolved but now the diarrhea is picked up since starting the antibiotics    -monitor stool output  -may need GI evaluation if bleeding occurs again    Discussed the above management plan with the primary service    HISTORY OF PRESENT ILLNESS:  Reason for Consult:  Scrotal cellulitis  HPI: Praveen Porter is a 47y o  year old male with a history of recurrent scrotal swelling and pain admitted to [facility] with recurrent scrotal swelling and pain and redness who I am asked to assist with management  The patient has had recurrent bouts of scrotal swelling and pain and diagnosed with scrotal cellulitis in the past 2 years that is been treated with amoxicillin with improvement but never going back to baseline  He had been feeling better and actually his scrotal swelling had decreased more than it ever previously  However on the day of admission he had the sudden onset of increased swelling pain and redness and therefore came to the ER for further evaluation  In the emergency department he was found to have a brisk leukocytosis, tachycardia, and severe hyperglycemia  He was diagnosed with scrotal cellulitis with associated sepsis, had blood cultures obtained and was started on vancomycin and Zosyn admitted for further management  Yesterday his antibiotics were de-escalated to ceftriaxone Flagyl  He is feeling better overall however he continues to have prominent swelling and pain involving the scrotum  Imaging of the scrotum has not revealed any abscess formation  He denies any headache or stiff neck, denies any sore throat or rhinorrhea or nasal congestion, denies any cough or shortness of breath, denies any nausea vomiting but has had some loose stool recently  He denies any chest pain or abdominal pain, denies any dysuria or hematuria  REVIEW OF SYSTEMS:  A complete 12 point system-based review of systems is negative other than that noted in the HPI      PAST MEDICAL HISTORY:  Past Medical History:   Diagnosis Date    AYLA treated with BiPAP      Past Surgical History:   Procedure Laterality Date    VASECTOMY         FAMILY HISTORY:  Non-contributory    SOCIAL HISTORY:  Social History   Social History     Substance and Sexual Activity   Alcohol Use Yes    Alcohol/week: 2 0 standard drinks    Types: 2 Glasses of wine per week    Frequency: Monthly or less    Drinks per session: 1 or 2    Binge frequency: Less than monthly    Comment: occ     Social History     Substance and Sexual Activity   Drug Use Never     Social History     Tobacco Use   Smoking Status Current Some Day Smoker    Packs/day: 0 25    Years: 1 00    Pack years: 0 25    Types: Cigars   Smokeless Tobacco Never Used   Tobacco Comment    occ       ALLERGIES:  Allergies   Allergen Reactions    Codeine        MEDICATIONS:  All current active medications have been reviewed  Antibiotics:  Ceftriaxone Flagyl 2    PHYSICAL EXAM:  Temp:  [98 4 °F (36 9 °C)-98 6 °F (37 °C)] 98 6 °F (37 °C)  HR:  [77-85] 77  Resp:  [17-18] 17  BP: (127-129)/(82-84) 127/82  SpO2:  [96 %-98 %] 96 %  Temp (24hrs), Av 5 °F (36 9 °C), Min:98 4 °F (36 9 °C), Max:98 6 °F (37 °C)  Current: Temperature: 98 6 °F (37 °C)    Intake/Output Summary (Last 24 hours) at 2019 1149  Last data filed at 2019 0900  Gross per 24 hour   Intake 1540 ml   Output --   Net 1540 ml       General Appearance:  Appearing well, nontoxic, and in no distress   Head:  Normocephalic, without obvious abnormality, atraumatic   Eyes:  Conjunctiva pink and sclera anicteric, both eyes   Nose: Nares normal, mucosa normal, no drainage   Throat: Oropharynx moist without lesions   Neck: Supple, symmetrical, no adenopathy, no tenderness/mass/nodules   Back:   Symmetric, no curvature, ROM normal, no CVA tenderness   Lungs:   Clear to auscultation bilaterally, respirations unlabored   Chest Wall:  No tenderness or deformity   Heart:  RRR; no murmur, rub or gallop   Abdomen:   Soft, non-tender, non-distended, positive bowel sounds    Genitourinary: Prominent swelling of the scrotum with notable erythema over the scrotum and spreading onto the groin region  No drainage  Extremities: No cyanosis, clubbing or edema   Skin: No rashes or lesions  No draining wounds noted     Lymph nodes: Cervical, supraclavicular nodes normal   Neurologic: Alert and oriented times 3, extremity strength 5/5 and symmetric       LABS, IMAGING, & OTHER STUDIES:  Lab Results:  I have personally reviewed pertinent labs  Results from last 7 days   Lab Units 11/25/19  0548 11/24/19  0434 11/23/19  1921 11/23/19  1350   WBC Thousand/uL 9 00 12 91*  --  20 15*   HEMOGLOBIN g/dL 13 4 15 0  --  16 6   PLATELETS Thousands/uL 164 188 204 200     Results from last 7 days   Lab Units 11/25/19  0548 11/24/19  0434 11/23/19  1350   SODIUM mmol/L 141 136 136   POTASSIUM mmol/L 3 4* 3 7 3 8   CHLORIDE mmol/L 109* 101 98*   CO2 mmol/L 22 22 24   BUN mg/dL 9 13 13   CREATININE mg/dL 0 76 1 02 1 06   EGFR ml/min/1 73sq m 106 85 81   CALCIUM mg/dL 7 6* 8 5 9 1   AST U/L 14 25 49*   ALT U/L 48 73 108*   ALK PHOS U/L 51 69 86     Results from last 7 days   Lab Units 11/23/19  1554 11/23/19  1502   BLOOD CULTURE  No Growth at 24 hrs  No Growth at 24 hrs  Imaging Studies:     CT abdomen pelvis-on specific swelling in the subcutaneous tissues in the bilateral inguinal region      Scrotal ultrasound-scrotal wall thickening and edema and inflammation but no abscess seen    Images personally reviewed by me in PACS

## 2019-11-26 LAB
ANION GAP SERPL CALCULATED.3IONS-SCNC: 11 MMOL/L (ref 4–13)
BUN SERPL-MCNC: 8 MG/DL (ref 5–25)
CALCIUM SERPL-MCNC: 8.8 MG/DL (ref 8.3–10.1)
CHLORIDE SERPL-SCNC: 105 MMOL/L (ref 100–108)
CO2 SERPL-SCNC: 26 MMOL/L (ref 21–32)
CREAT SERPL-MCNC: 0.93 MG/DL (ref 0.6–1.3)
ERYTHROCYTE [DISTWIDTH] IN BLOOD BY AUTOMATED COUNT: 12.4 % (ref 11.6–15.1)
GFR SERPL CREATININE-BSD FRML MDRD: 95 ML/MIN/1.73SQ M
GLUCOSE SERPL-MCNC: 129 MG/DL (ref 65–140)
GLUCOSE SERPL-MCNC: 151 MG/DL (ref 65–140)
GLUCOSE SERPL-MCNC: 179 MG/DL (ref 65–140)
GLUCOSE SERPL-MCNC: 214 MG/DL (ref 65–140)
GLUCOSE SERPL-MCNC: 219 MG/DL (ref 65–140)
HCT VFR BLD AUTO: 42.3 % (ref 36.5–49.3)
HGB BLD-MCNC: 14.7 G/DL (ref 12–17)
MCH RBC QN AUTO: 31.1 PG (ref 26.8–34.3)
MCHC RBC AUTO-ENTMCNC: 34.8 G/DL (ref 31.4–37.4)
MCV RBC AUTO: 89 FL (ref 82–98)
PLATELET # BLD AUTO: 203 THOUSANDS/UL (ref 149–390)
PMV BLD AUTO: 9.6 FL (ref 8.9–12.7)
POTASSIUM SERPL-SCNC: 3.7 MMOL/L (ref 3.5–5.3)
RBC # BLD AUTO: 4.73 MILLION/UL (ref 3.88–5.62)
SODIUM SERPL-SCNC: 142 MMOL/L (ref 136–145)
WBC # BLD AUTO: 9.43 THOUSAND/UL (ref 4.31–10.16)

## 2019-11-26 PROCEDURE — 99232 SBSQ HOSP IP/OBS MODERATE 35: CPT | Performed by: NURSE PRACTITIONER

## 2019-11-26 PROCEDURE — 85027 COMPLETE CBC AUTOMATED: CPT | Performed by: PHYSICIAN ASSISTANT

## 2019-11-26 PROCEDURE — 80048 BASIC METABOLIC PNL TOTAL CA: CPT | Performed by: PHYSICIAN ASSISTANT

## 2019-11-26 PROCEDURE — 99232 SBSQ HOSP IP/OBS MODERATE 35: CPT | Performed by: INTERNAL MEDICINE

## 2019-11-26 PROCEDURE — 94760 N-INVAS EAR/PLS OXIMETRY 1: CPT

## 2019-11-26 PROCEDURE — 82948 REAGENT STRIP/BLOOD GLUCOSE: CPT

## 2019-11-26 RX ADMIN — PANTOPRAZOLE SODIUM 40 MG: 40 TABLET, DELAYED RELEASE ORAL at 06:34

## 2019-11-26 RX ADMIN — SODIUM CHLORIDE 125 ML/HR: 0.9 INJECTION, SOLUTION INTRAVENOUS at 20:10

## 2019-11-26 RX ADMIN — PANTOPRAZOLE SODIUM 40 MG: 40 TABLET, DELAYED RELEASE ORAL at 17:19

## 2019-11-26 RX ADMIN — FUROSEMIDE 20 MG: 10 INJECTION, SOLUTION INTRAMUSCULAR; INTRAVENOUS at 08:26

## 2019-11-26 RX ADMIN — INSULIN LISPRO 1 UNITS: 100 INJECTION, SOLUTION INTRAVENOUS; SUBCUTANEOUS at 22:01

## 2019-11-26 RX ADMIN — METRONIDAZOLE 500 MG: 500 TABLET ORAL at 22:01

## 2019-11-26 RX ADMIN — CEFAZOLIN SODIUM 2000 MG: 2 SOLUTION INTRAVENOUS at 12:51

## 2019-11-26 RX ADMIN — GABAPENTIN 100 MG: 100 CAPSULE ORAL at 08:26

## 2019-11-26 RX ADMIN — METRONIDAZOLE 500 MG: 500 TABLET ORAL at 14:15

## 2019-11-26 RX ADMIN — INSULIN LISPRO 4 UNITS: 100 INJECTION, SOLUTION INTRAVENOUS; SUBCUTANEOUS at 08:28

## 2019-11-26 RX ADMIN — CEFAZOLIN SODIUM 2000 MG: 2 SOLUTION INTRAVENOUS at 04:27

## 2019-11-26 RX ADMIN — ENOXAPARIN SODIUM 40 MG: 40 INJECTION SUBCUTANEOUS at 08:27

## 2019-11-26 RX ADMIN — INSULIN LISPRO 2 UNITS: 100 INJECTION, SOLUTION INTRAVENOUS; SUBCUTANEOUS at 17:20

## 2019-11-26 RX ADMIN — CLOTRIMAZOLE: 1 CREAM TOPICAL at 17:20

## 2019-11-26 RX ADMIN — Medication 250 MG: at 17:19

## 2019-11-26 RX ADMIN — GABAPENTIN 100 MG: 100 CAPSULE ORAL at 17:19

## 2019-11-26 RX ADMIN — CLOTRIMAZOLE: 1 CREAM TOPICAL at 08:40

## 2019-11-26 RX ADMIN — INSULIN GLARGINE 20 UNITS: 100 INJECTION, SOLUTION SUBCUTANEOUS at 22:01

## 2019-11-26 RX ADMIN — Medication 250 MG: at 08:26

## 2019-11-26 RX ADMIN — CEFAZOLIN SODIUM 2000 MG: 2 SOLUTION INTRAVENOUS at 20:55

## 2019-11-26 RX ADMIN — SODIUM CHLORIDE 125 ML/HR: 0.9 INJECTION, SOLUTION INTRAVENOUS at 10:27

## 2019-11-26 RX ADMIN — METRONIDAZOLE 500 MG: 500 TABLET ORAL at 06:34

## 2019-11-26 NOTE — ASSESSMENT & PLAN NOTE
· POA, as evidenced by tachycardia, leukocytosis, lactic acidosis and scrotal cellulitis on US, improving  · Pt reports history of scrotal cellulitis as outpatient, previously treated with amoxicillin with resolution   · Continue IVF hydration   · ID following,  · Transitioned to IV ancef and flagyl, continue, possible transition to PO in 24 hours  · Topical clotrimazole cream BID  · HgbA1c significantly elevated at 11 9%, likely etiology for recurrent scrotal infections   · Blood cultures x 48 hours negative  · UA negative   · Supportive care with PRN tylenol, pt denies any severe pain currently   · Continue to trend temps/WBC closely

## 2019-11-26 NOTE — ASSESSMENT & PLAN NOTE
· Pt reported multiple episodes of diarrhea s/p vacation in [facility]   · Lasted for 2 days and then resolved  · Reports diarrhea today, likely in setting of antibiotic therapy, monitor stool output closely  · Add probiotic  · ID following  · Denies any nausea/vomiting  · Continue to monitor stool output  · Supportive care with IVF hydration

## 2019-11-26 NOTE — ASSESSMENT & PLAN NOTE
ORAL CARE DONE · Pt reports BRBPR in the toilet and then an episode of dark stool, concerning for possible melena  · Hgb down trended to 13 4, however has improved to 14 7 likely decrease from hemodilution from aggressive IVF hydration, however patient reports additional episode of black stool today   · On PPI BID   · Probiotic for diarrhea s/p abx therapy   · Supportive care with IVF

## 2019-11-26 NOTE — ASSESSMENT & PLAN NOTE
· Pt reports edema and warmth, denies any significant pain   · Scrotal US with scrotal wall thickening, edema and inflammation, testes appear normal  No abscess formation noted     · Pt reports recurrent episodes, previously treated with amoxicillin outpatient without resolution   · Underlying uncontrolled diabetes likely cause of recurrent infections   · Urology following,  · Recommending scrotal support, supportive care   · Place on IV lasix 20 mg daily for scrotal edema as this appears to be persistent   · ID following,  · Transitioned to IV Ancef/Flagyl and added clotrimazole cream BID to groin and scrotum

## 2019-11-26 NOTE — ASSESSMENT & PLAN NOTE
· Newly diagnosed diabetes mellitus this admission with hgbA1c 11 9%, likely type 2  · Increase Lantus to 20 units QHS, scheduled humalog to 8 units TID and increase SSI algorithm coverage  · Glucose slowly improving, continues to be low 200s, monitor with adjustments  · Consider endocrinology consultation if no improvement noted on increased insulin coverage  · Will need endocrine follow up outpatient and insulin supplies on discharge  · Monitor QID glucose checks  · Continue consistent carb diet, nutrition consultation   · Pt also reports neuropathic pain, will trial gabapentin 100 mg BID and titrate as needed  · Attempt to obtain records from [facility]  as patient reports he recently had blood work there in May when he had a previous cellulitic infection   · Likely etiology for recurrent scrotal infections

## 2019-11-26 NOTE — PROGRESS NOTES
Progress Note - Infectious Disease   Gisella Late 47 y o  male MRN: [id number]  Unit/Bed#: -Uziel Encounter: [id number]      Impression/Plan:  1  Severe sepsis-POA  Tachycardia, leukocytosis, lactic acidosis  Appears to be secondary to a scrotal cellulitis which has been recurrent in nature  He has clinically improved with decreased heart rate, and is noted to have a decreased WBC count as well as resolution of the lactic acidosis  He seems to be tolerating the antibiotics without difficulty  I suspect the infection is streptococcal although other organisms are possible in the perineum  Thus far his blood cultures remain negative   -continue cefazolin Flagyl  -follow-up blood cultures  -recheck CBC with diff and creatinine   -possibly to oral antibiotics the next 24-48 hours if significant improvement noted in the cellulitis     2  Recurrent scrotal cellulitis-in the setting of newly diagnosed diabetes mellitus  Suspect this is certainly increase in the risk of there is recurrent bouts  Perhaps a component of tinea cruris is playing a role in leading to these bouts  The erythema has improved although the swelling is stable  -antibiotics as above  -clotrimazole cream twice daily to the groin and scrotum  -serial exams  -urology follow-up     3  Diabetes mellitus-type 2 with hyperglycemia  Newly diagnosed  Likely predisposing factor to the patient's recurrent cellulitis      4  Diarrhea-with possible bright red blood per rectum in the toilet an episode of dark stool  Unclear significance  Hemoglobin remains stable  Seems to have resolved but now the diarrhea is picked up since starting the antibiotics    But the diarrhea remains relatively mild and I suspect this is merely an antibiotic effect  -monitor stool output  -may need GI evaluation if bleeding occurs again  -if the diarrhea would significantly worsen consider checking stool for C diff     Discussed the above management plan with Urology who had seen the patient over the weekend    Antibiotics:  Cefazolin 2  Flagyl 3  Antibiotics 4    Subjective:  Patient has no fever, chills, sweats; no nausea, vomiting, diarrhea; no cough, shortness of breath; no pain  No new symptoms  No history of travel to any [place] countries except [facility] a few weeks ago    Objective:  Vitals:  Temp:  [97 8 °F (36 6 °C)-98 4 °F (36 9 °C)] 98 2 °F (36 8 °C)  HR:  [66-78] 66  Resp:  [17-18] 18  BP: (125-140)/(77-85) 140/85  SpO2:  [92 %-98 %] 97 %  Temp (24hrs), Av 1 °F (36 7 °C), Min:97 8 °F (36 6 °C), Max:98 4 °F (36 9 °C)  Current: Temperature: 98 2 °F (36 8 °C)    Physical Exam:   General Appearance:  Alert, interactive, nontoxic, no acute distress  Throat: Oropharynx moist without lesions  Lungs:   Clear to auscultation bilaterally; no wheezes, rhonchi or rales; respirations unlabored   Heart:  RRR; no murmur, rub or gallop   Abdomen:   Soft, non-tender, non-distended, positive bowel sounds  Genitourinary: Decreased scrotal erythema but stable edema   Extremities: No clubbing, cyanosis or edema   Skin: No new rashes or lesions  No draining wounds noted         Labs, Imaging, & Other studies:   All pertinent labs and imaging studies were personally reviewed  Results from last 7 days   Lab Units 19  0642 19  1648 19  0548 19  0434   WBC Thousand/uL 9 43  --  9 00 12 91*   HEMOGLOBIN g/dL 14 7 15 3 13 4 15 0   PLATELETS Thousands/uL 203  --  164 188     Results from last 7 days   Lab Units 19  0641 19  0548 19  0434 19  1350   SODIUM mmol/L 142 141 136 136   POTASSIUM mmol/L 3 7 3 4* 3 7 3 8   CHLORIDE mmol/L 105 109* 101 98*   CO2 mmol/L 26 22 22 24   BUN mg/dL 8 9 13 13   CREATININE mg/dL 0 93 0 76 1 02 1 06   EGFR ml/min/1 73sq m 95 106 85 81   CALCIUM mg/dL 8 8 7 6* 8 5 9 1   AST U/L  --  14 25 49*   ALT U/L  --  48 73 108*   ALK PHOS U/L  --  51 69 86     Results from last 7 days   Lab Units 19  8854 11/23/19  1502   BLOOD CULTURE  No Growth at 48 hrs  No Growth at 48 hrs

## 2019-11-26 NOTE — PROGRESS NOTES
[facility]  Progress Note - Kalehansel Epley 1970, 47 y o  male MRN: [id number]    Unit/Bed#: -01 Encounter: [id number]    Primary Care Provider: Lupe Cary   Date and time admitted to hospital: 11/23/2019 12:33 PM    * Severe sepsis [facility])  Assessment & Plan  · POA, as evidenced by tachycardia, leukocytosis, lactic acidosis and scrotal cellulitis on US, improving  · Pt reports history of scrotal cellulitis as outpatient, previously treated with amoxicillin with resolution   · Continue IVF hydration   · ID following,  · Transitioned to IV ancef and flagyl, continue, possible transition to PO in 24 hours  · Topical clotrimazole cream BID  · HgbA1c significantly elevated at 11 9%, likely etiology for recurrent scrotal infections   · Blood cultures x 48 hours negative  · UA negative   · Supportive care with PRN tylenol, pt denies any severe pain currently   · Continue to trend temps/WBC closely     Cellulitis of groin  Assessment & Plan  · Pt reports edema and warmth, denies any significant pain   · Scrotal US with scrotal wall thickening, edema and inflammation, testes appear normal  No abscess formation noted     · Pt reports recurrent episodes, previously treated with amoxicillin outpatient without resolution   · Underlying uncontrolled diabetes likely cause of recurrent infections   · Urology following,  · Recommending scrotal support, supportive care   · Place on IV lasix 20 mg daily for scrotal edema as this appears to be persistent   · ID following,  · Transitioned to IV Ancef/Flagyl and added clotrimazole cream BID to groin and scrotum     Diabetes mellitus with hyperglycemia (HCC)  Assessment & Plan  · Newly diagnosed diabetes mellitus this admission with hgbA1c 11 9%, likely type 2  · Increase Lantus to 20 units QHS, scheduled humalog to 8 units TID and increase SSI algorithm coverage  · Glucose slowly improving, continues to be low 200s, monitor with adjustments  · Consider endocrinology consultation if no improvement noted on increased insulin coverage  · Will need endocrine follow up outpatient and insulin supplies on discharge  · Monitor QID glucose checks  · Continue consistent carb diet, nutrition consultation   · Pt also reports neuropathic pain, will trial gabapentin 100 mg BID and titrate as needed  · Attempt to obtain records from [facility]  as patient reports he recently had blood work there in May when he had a previous cellulitic infection   · Likely etiology for recurrent scrotal infections     Melena  Assessment & Plan  · Pt reports BRBPR in the toilet and then an episode of dark stool, concerning for possible melena  · Hgb down trended to 13 4, however has improved to 14 7 likely decrease from hemodilution from aggressive IVF hydration, however patient reports additional episode of black stool today   · On PPI BID   · Probiotic for diarrhea s/p abx therapy   · Supportive care with IVF    Gastroenteritis  Assessment & Plan  · Pt reported multiple episodes of diarrhea s/p vacation in [facility]   · Lasted for 2 days and then resolved  · Reports diarrhea today, likely in setting of antibiotic therapy, monitor stool output closely  · Add probiotic  · ID following  · Denies any nausea/vomiting  · Continue to monitor stool output  · Supportive care with IVF hydration     AYLA (obstructive sleep apnea)  Assessment & Plan  · Pt requires Bipap at night  · Continue here     VTE Pharmacologic Prophylaxis:   Pharmacologic: Enoxaparin (Lovenox)  Mechanical VTE Prophylaxis in Place: Yes    Patient Centered Rounds: I have performed bedside rounds with nursing staff today      Discussions with Specialists or Other Care Team Provider:  Reviewed infectious disease notes reviewed primary nurse notes reviewed previous provider notes discussed with case management primary RN    Education and Discussions with Family / Patient:  Educated patient on current plan of care denies any additional questions or concerns at this time    Time Spent for Care: 20 minutes  More than 50% of total time spent on counseling and coordination of care as described above  Current Length of Stay: 3 day(s)    Current Patient Status: Inpatient   Certification Statement: The patient will continue to require additional inpatient hospital stay due to Patient is still having elevated blood sugars, has had minimal improvement cellulitis and is still requiring IV antibiotics    Discharge Plan / Estimated Discharge Date:  Hopeful within the next 48 hours      Code Status: Level 1 - Full Code      Subjective:   Denies any chest pain chest tightness shortness of breath or difficulty breathing  He is reporting a mild headache and some vision changes  He is mildly overwhelmed given the new diagnosis of diabetes  He reports that the swelling and redness has not changed much, denies any scrotal pain  Ambulates without difficulty tolerating meals    Objective:     Vitals:   Temp (24hrs), Av 1 °F (36 7 °C), Min:97 8 °F (36 6 °C), Max:98 4 °F (36 9 °C)    Temp:  [97 8 °F (36 6 °C)-98 4 °F (36 9 °C)] 97 8 °F (36 6 °C)  HR:  [66-69] 69  Resp:  [17-18] 18  BP: (125-142)/(77-85) 142/84  SpO2:  [95 %-99 %] 99 %  Body mass index is 30 83 kg/m²  Input and Output Summary (last 24 hours): Intake/Output Summary (Last 24 hours) at 2019 1819  Last data filed at 2019 1255  Gross per 24 hour   Intake 3220 ml   Output 1200 ml   Net 2020 ml       Physical Exam:     Physical Exam   Constitutional: He appears well-developed and well-nourished  HENT:   Head: Normocephalic  Eyes: Pupils are equal, round, and reactive to light  Neck: Normal range of motion  Cardiovascular: Normal rate  Pulmonary/Chest: Effort normal    Abdominal: Soft  Genitourinary: Right testis shows swelling  Left testis shows swelling  Musculoskeletal: He exhibits edema  Neurological: He is alert  Skin: Skin is warm and dry   There is erythema  Psychiatric: He has a normal mood and affect  His speech is normal and behavior is normal  Judgment and thought content normal  Cognition and memory are normal    Nursing note and vitals reviewed  Additional Data:     Labs:    Results from last 7 days   Lab Units 11/26/19  0642  11/23/19  1350   WBC Thousand/uL 9 43   < > 20 15*   HEMOGLOBIN g/dL 14 7   < > 16 6   HEMATOCRIT % 42 3   < > 47 1   PLATELETS Thousands/uL 203   < > 200   NEUTROS PCT %  --   --  89*   LYMPHS PCT %  --   --  5*   MONOS PCT %  --   --  6   EOS PCT %  --   --  0    < > = values in this interval not displayed  Results from last 7 days   Lab Units 11/26/19  0641 11/25/19  0548   POTASSIUM mmol/L 3 7 3 4*   CHLORIDE mmol/L 105 109*   CO2 mmol/L 26 22   BUN mg/dL 8 9   CREATININE mg/dL 0 93 0 76   CALCIUM mg/dL 8 8 7 6*   ALK PHOS U/L  --  51   ALT U/L  --  48   AST U/L  --  14     Results from last 7 days   Lab Units 11/23/19  1350   INR  1 01       * I Have Reviewed All Lab Data Listed Above  * Additional Pertinent Lab Tests Reviewed: [facility] Admission Reviewed    Recent Cultures (last 7 days):     Results from last 7 days   Lab Units 11/23/19  1554 11/23/19  1502   BLOOD CULTURE  No Growth at 48 hrs  No Growth at 48 hrs         Last 24 Hours Medication List:     Current Facility-Administered Medications:  acetaminophen 650 mg Oral Q6H PRN Remi Rodriguez PA-C    cefazolin 2,000 mg Intravenous Q8H Oz Mora MD Last Rate: 2,000 mg (11/26/19 1251)   clotrimazole  Topical BID Oz Mora MD    enoxaparin 40 mg Subcutaneous Daily Sonia Morocho PA-C    furosemide 20 mg Intravenous Daily Sonia Morocho PA-C    gabapentin 100 mg Oral BID Sonia Morocho PA-C    insulin glargine 20 Units Subcutaneous HS Sonia Morocho PA-C    insulin lispro 1-6 Units Subcutaneous HS Sonia Morocho PA-C    insulin lispro 2-12 Units Subcutaneous TID AC Sonia Morocho PA-C    insulin lispro 8 Units Subcutaneous TID With Meals Rivka Alexander PA-C    metroNIDAZOLE 500 mg Oral [facility] Chaitanya Morocho PA-C    pantoprazole 40 mg Oral BID AC Dora Cool MD    saccharomyces boulardii 250 mg Oral BID Rivka Alexander PA-C    sodium chloride 125 mL/hr Intravenous Continuous Rivka Alexander PA-C Last Rate: 125 mL/hr (11/26/19 1027)        Today, Patient Was Seen By: MEG Gerard    ** Please Note: Dragon 360 Dictation voice to text software may have been used in the creation of this document   **

## 2019-11-27 LAB
ANION GAP SERPL CALCULATED.3IONS-SCNC: 9 MMOL/L (ref 4–13)
BUN SERPL-MCNC: 8 MG/DL (ref 5–25)
CALCIUM SERPL-MCNC: 9.1 MG/DL (ref 8.3–10.1)
CHLORIDE SERPL-SCNC: 103 MMOL/L (ref 100–108)
CO2 SERPL-SCNC: 30 MMOL/L (ref 21–32)
CREAT SERPL-MCNC: 0.97 MG/DL (ref 0.6–1.3)
ERYTHROCYTE [DISTWIDTH] IN BLOOD BY AUTOMATED COUNT: 12.5 % (ref 11.6–15.1)
GFR SERPL CREATININE-BSD FRML MDRD: 91 ML/MIN/1.73SQ M
GLUCOSE SERPL-MCNC: 142 MG/DL (ref 65–140)
GLUCOSE SERPL-MCNC: 164 MG/DL (ref 65–140)
GLUCOSE SERPL-MCNC: 173 MG/DL (ref 65–140)
GLUCOSE SERPL-MCNC: 185 MG/DL (ref 65–140)
GLUCOSE SERPL-MCNC: 199 MG/DL (ref 65–140)
HCT VFR BLD AUTO: 45.1 % (ref 36.5–49.3)
HGB BLD-MCNC: 15.9 G/DL (ref 12–17)
MCH RBC QN AUTO: 31.5 PG (ref 26.8–34.3)
MCHC RBC AUTO-ENTMCNC: 35.3 G/DL (ref 31.4–37.4)
MCV RBC AUTO: 89 FL (ref 82–98)
PLATELET # BLD AUTO: 251 THOUSANDS/UL (ref 149–390)
PMV BLD AUTO: 9.2 FL (ref 8.9–12.7)
POTASSIUM SERPL-SCNC: 3.5 MMOL/L (ref 3.5–5.3)
RBC # BLD AUTO: 5.05 MILLION/UL (ref 3.88–5.62)
SODIUM SERPL-SCNC: 142 MMOL/L (ref 136–145)
WBC # BLD AUTO: 7.31 THOUSAND/UL (ref 4.31–10.16)

## 2019-11-27 PROCEDURE — 85027 COMPLETE CBC AUTOMATED: CPT | Performed by: NURSE PRACTITIONER

## 2019-11-27 PROCEDURE — 94660 CPAP INITIATION&MGMT: CPT

## 2019-11-27 PROCEDURE — 99232 SBSQ HOSP IP/OBS MODERATE 35: CPT | Performed by: NURSE PRACTITIONER

## 2019-11-27 PROCEDURE — 99232 SBSQ HOSP IP/OBS MODERATE 35: CPT | Performed by: INTERNAL MEDICINE

## 2019-11-27 PROCEDURE — 80048 BASIC METABOLIC PNL TOTAL CA: CPT | Performed by: NURSE PRACTITIONER

## 2019-11-27 PROCEDURE — 82948 REAGENT STRIP/BLOOD GLUCOSE: CPT

## 2019-11-27 PROCEDURE — 94760 N-INVAS EAR/PLS OXIMETRY 1: CPT

## 2019-11-27 RX ORDER — CLOTRIMAZOLE 1 %
CREAM (GRAM) TOPICAL 2 TIMES DAILY
Qty: 30 G | Refills: 0 | Status: SHIPPED | OUTPATIENT
Start: 2019-11-27

## 2019-11-27 RX ORDER — PANTOPRAZOLE SODIUM 40 MG/1
40 TABLET, DELAYED RELEASE ORAL DAILY
Qty: 30 TABLET | Refills: 0 | Status: SHIPPED | OUTPATIENT
Start: 2019-11-27

## 2019-11-27 RX ORDER — GABAPENTIN 100 MG/1
100 CAPSULE ORAL 2 TIMES DAILY
Qty: 90 CAPSULE | Refills: 0 | Status: SHIPPED | OUTPATIENT
Start: 2019-11-27

## 2019-11-27 RX ORDER — SACCHAROMYCES BOULARDII 250 MG
250 CAPSULE ORAL 2 TIMES DAILY
Qty: 60 CAPSULE | Refills: 0 | Status: SHIPPED | OUTPATIENT
Start: 2019-11-27

## 2019-11-27 RX ORDER — INSULIN GLARGINE 100 [IU]/ML
25 INJECTION, SOLUTION SUBCUTANEOUS
Status: DISCONTINUED | OUTPATIENT
Start: 2019-11-27 | End: 2019-11-28 | Stop reason: HOSPADM

## 2019-11-27 RX ORDER — INSULIN GLARGINE 100 [IU]/ML
25 INJECTION, SOLUTION SUBCUTANEOUS
Qty: 10 ML | Refills: 0 | Status: SHIPPED | OUTPATIENT
Start: 2019-11-27

## 2019-11-27 RX ORDER — METRONIDAZOLE 500 MG/1
500 TABLET ORAL EVERY 8 HOURS SCHEDULED
Qty: 18 TABLET | Refills: 0 | Status: SHIPPED | OUTPATIENT
Start: 2019-11-27 | End: 2019-11-28 | Stop reason: HOSPADM

## 2019-11-27 RX ADMIN — GABAPENTIN 100 MG: 100 CAPSULE ORAL at 08:44

## 2019-11-27 RX ADMIN — METRONIDAZOLE 500 MG: 500 TABLET ORAL at 14:55

## 2019-11-27 RX ADMIN — GABAPENTIN 100 MG: 100 CAPSULE ORAL at 17:49

## 2019-11-27 RX ADMIN — PANTOPRAZOLE SODIUM 40 MG: 40 TABLET, DELAYED RELEASE ORAL at 17:49

## 2019-11-27 RX ADMIN — METRONIDAZOLE 500 MG: 500 TABLET ORAL at 21:28

## 2019-11-27 RX ADMIN — CEFAZOLIN SODIUM 2000 MG: 2 SOLUTION INTRAVENOUS at 04:32

## 2019-11-27 RX ADMIN — CLOTRIMAZOLE 1 APPLICATION: 1 CREAM TOPICAL at 08:49

## 2019-11-27 RX ADMIN — ACETAMINOPHEN 650 MG: 325 TABLET, FILM COATED ORAL at 18:10

## 2019-11-27 RX ADMIN — CLOTRIMAZOLE: 1 CREAM TOPICAL at 17:51

## 2019-11-27 RX ADMIN — FUROSEMIDE 20 MG: 10 INJECTION, SOLUTION INTRAMUSCULAR; INTRAVENOUS at 08:49

## 2019-11-27 RX ADMIN — PANTOPRAZOLE SODIUM 40 MG: 40 TABLET, DELAYED RELEASE ORAL at 08:54

## 2019-11-27 RX ADMIN — INSULIN GLARGINE 25 UNITS: 100 INJECTION, SOLUTION SUBCUTANEOUS at 21:28

## 2019-11-27 RX ADMIN — INSULIN LISPRO 1 UNITS: 100 INJECTION, SOLUTION INTRAVENOUS; SUBCUTANEOUS at 21:28

## 2019-11-27 RX ADMIN — SODIUM CHLORIDE 125 ML/HR: 0.9 INJECTION, SOLUTION INTRAVENOUS at 05:38

## 2019-11-27 RX ADMIN — CEFAZOLIN SODIUM 2000 MG: 2 SOLUTION INTRAVENOUS at 12:31

## 2019-11-27 RX ADMIN — Medication 250 MG: at 08:44

## 2019-11-27 RX ADMIN — CEFAZOLIN SODIUM 2000 MG: 2 SOLUTION INTRAVENOUS at 20:35

## 2019-11-27 RX ADMIN — ENOXAPARIN SODIUM 40 MG: 40 INJECTION SUBCUTANEOUS at 08:49

## 2019-11-27 RX ADMIN — INSULIN LISPRO 2 UNITS: 100 INJECTION, SOLUTION INTRAVENOUS; SUBCUTANEOUS at 08:49

## 2019-11-27 RX ADMIN — METRONIDAZOLE 500 MG: 500 TABLET ORAL at 05:37

## 2019-11-27 RX ADMIN — Medication 250 MG: at 17:49

## 2019-11-27 RX ADMIN — INSULIN LISPRO 2 UNITS: 100 INJECTION, SOLUTION INTRAVENOUS; SUBCUTANEOUS at 12:38

## 2019-11-27 NOTE — ASSESSMENT & PLAN NOTE
· Pt reports edema and warmth, denies any significant pain   · Scrotal US with scrotal wall thickening, edema and inflammation, testes appear normal  No abscess formation noted     · Pt reports recurrent episodes, previously treated with amoxicillin outpatient without resolution   · Underlying uncontrolled diabetes likely cause of recurrent infections   · Urology following,  · Recommending scrotal support, supportive care   · Place on IV lasix 20 mg daily for scrotal edema as this appears to be persistent  · Discontinued IV fluids  · ID following,  · IV Ancef/Flagyl and added clotrimazole cream BID to groin and scrotum

## 2019-11-27 NOTE — PROGRESS NOTES
[facility]  Progress Note - Markshahriar Chinchillajenna 1970, 47 y o  male MRN: [id number]    Unit/Bed#: -01 Encounter: [id number]    Primary Care Provider: Dipesh Arshad   Date and time admitted to hospital: 11/23/2019 12:33 PM    * Severe sepsis [facility])  Assessment & Plan  · POA, as evidenced by tachycardia, leukocytosis, lactic acidosis and scrotal cellulitis on US, improving  · Pt reports history of scrotal cellulitis as outpatient, previously treated with amoxicillin with resolution   · ID following,  · Continue IV ancef and flagyl, continue, possible transition to PO in 24 hours  · Topical clotrimazole cream BID  · HgbA1c significantly elevated at 11 9%, likely etiology for recurrent scrotal infections   · Blood cultures x >48 hours negative  · UA negative   · Supportive care with PRN tylenol, pt denies any severe pain currently   · Continue to trend temps/WBC closely     Cellulitis of groin  Assessment & Plan  · Pt reports edema and warmth, denies any significant pain   · Scrotal US with scrotal wall thickening, edema and inflammation, testes appear normal  No abscess formation noted     · Pt reports recurrent episodes, previously treated with amoxicillin outpatient without resolution   · Underlying uncontrolled diabetes likely cause of recurrent infections   · Urology following,  · Recommending scrotal support, supportive care   · Place on IV lasix 20 mg daily for scrotal edema as this appears to be persistent  · Discontinued IV fluids  · ID following,  · IV Ancef/Flagyl and added clotrimazole cream BID to groin and scrotum     Diabetes mellitus with hyperglycemia (HCC)  Assessment & Plan  · Newly diagnosed diabetes mellitus this admission with hgbA1c 11 9%, likely type 2  · Increase Lantus to 25 units QHS, scheduled humalog to 8 units TID and increase SSI algorithm coverage  · Glucose slowly improving, continues to be low 200s, monitor with adjustments  · Consider endocrinology consultation if no improvement noted on increased insulin coverage  · Will need endocrine follow up outpatient and insulin supplies on discharge  · Monitor QID glucose checks  · Continue consistent carb diet, nutrition consultation   · Pt also reports neuropathic pain, will trial gabapentin 100 mg BID and titrate as needed  · Attempt to obtain records from [facility]  as patient reports he recently had blood work there in May when he had a previous cellulitic infection   · Likely etiology for recurrent scrotal infections     Melena  Assessment & Plan  · Pt reports BRBPR in the toilet and then an episode of dark stool on day of admission, concerning for possible melena  · Hemoglobin 15 9  · On PPI BID   · Probiotic for diarrhea s/p abx therapy     Gastroenteritis  Assessment & Plan  · Pt reported multiple episodes of diarrhea s/p vacation in [facility]   · Lasted for 2 days and then resolved  · Reports persistent diarrhea, likely in setting of gastroenteritis verses antibiotics  · Continue probiotic  · ID following  · Denies any nausea/vomiting  · Continue to monitor stool output    AYLA (obstructive sleep apnea)  Assessment & Plan  · Pt requires Bipap at night  · Continue here       VTE Pharmacologic Prophylaxis:   Pharmacologic: Enoxaparin (Lovenox)  Mechanical VTE Prophylaxis in Place: Yes    Patient Centered Rounds: I have performed bedside rounds with nursing staff today  Discussions with Specialists or Other Care Team Provider:  Discussed with Infectious Disease, discussed with Urology, discussed with care management primary RN reviewed previous provider's notes    Education and Discussions with Family / Patient:  Educated patient on current plan of care denies any additional questions or concerns at this time    Time Spent for Care: 30 minutes  More than 50% of total time spent on counseling and coordination of care as described above      Current Length of Stay: 4 day(s)    Current Patient Status: Inpatient Certification Statement: The patient will continue to require additional inpatient hospital stay due to Tighter glycemic control, and IV antibiotics    Discharge Plan / Estimated Discharge Date:  Hopeful within the next 48 hours pending improvement of scrotal edema      Code Status: Level 1 - Full Code      Subjective:   Denies any chest pain chest tightness shortness of breath or difficulty breathing, does report mild persistent diarrhea  Denies any scrotal pain or discomfort, denies any issues with voiding  Denies any scrotal pain but still has persistent edema educated on importance of elevation  Patient is still feeling very overwhelmed regarding insulin, and new diagnosis of diabetes  Explained to patient that he will need to do insulin until his hemoglobin A1c is improved  Patient is open to the in a on discharge for education and assistance with monitoring    Objective:     Vitals:   Temp (24hrs), Av 7 °F (36 5 °C), Min:97 6 °F (36 4 °C), Max:97 8 °F (36 6 °C)    Temp:  [97 6 °F (36 4 °C)-97 8 °F (36 6 °C)] 97 8 °F (36 6 °C)  HR:  [57-69] 57  Resp:  [18] 18  BP: (136-142)/(76-85) 141/85  SpO2:  [97 %-99 %] 98 %  Body mass index is 30 83 kg/m²  Input and Output Summary (last 24 hours): Intake/Output Summary (Last 24 hours) at 2019 1156  Last data filed at 2019 0900  Gross per 24 hour   Intake 3440 ml   Output 1000 ml   Net 2440 ml       Physical Exam:     Physical Exam   Constitutional: He is oriented to person, place, and time  He appears well-developed and well-nourished  HENT:   Head: Normocephalic  Eyes: Pupils are equal, round, and reactive to light  Neck: Normal range of motion  Cardiovascular: Normal rate  Pulmonary/Chest: Effort normal    Abdominal: Soft  Bowel sounds are normal    Genitourinary: Right testis shows swelling  Left testis shows swelling     Genitourinary Comments: Improved scrotal redness however persistent edema   Musculoskeletal: Normal range of motion  Neurological: He is alert and oriented to person, place, and time  Skin: Skin is warm and dry  There is erythema  Psychiatric: He has a normal mood and affect  His behavior is normal  Thought content normal    Nursing note and vitals reviewed  Additional Data:     Labs:    Results from last 7 days   Lab Units 11/27/19  1057  11/23/19  1350   WBC Thousand/uL 7 31   < > 20 15*   HEMOGLOBIN g/dL 15 9   < > 16 6   HEMATOCRIT % 45 1   < > 47 1   PLATELETS Thousands/uL 251   < > 200   NEUTROS PCT %  --   --  89*   LYMPHS PCT %  --   --  5*   MONOS PCT %  --   --  6   EOS PCT %  --   --  0    < > = values in this interval not displayed  Results from last 7 days   Lab Units 11/27/19  1057  11/25/19  0548   POTASSIUM mmol/L 3 5   < > 3 4*   CHLORIDE mmol/L 103   < > 109*   CO2 mmol/L 30   < > 22   BUN mg/dL 8   < > 9   CREATININE mg/dL 0 97   < > 0 76   CALCIUM mg/dL 9 1   < > 7 6*   ALK PHOS U/L  --   --  51   ALT U/L  --   --  48   AST U/L  --   --  14    < > = values in this interval not displayed  Results from last 7 days   Lab Units 11/23/19  1350   INR  1 01       * I Have Reviewed All Lab Data Listed Above  * Additional Pertinent Lab Tests Reviewed: [facility] Admission Reviewed      Recent Cultures (last 7 days):     Results from last 7 days   Lab Units 11/23/19  1554 11/23/19  1502   BLOOD CULTURE  No Growth at 72 hrs  No Growth at 72 hrs         Last 24 Hours Medication List:     Current Facility-Administered Medications:  acetaminophen 650 mg Oral Q6H PRN Ignacia Cabot, PA-C    cefazolin 2,000 mg Intravenous Q8H Jame Desai MD Last Rate: 2,000 mg (11/27/19 0432)   clotrimazole  Topical BID Jame Desai MD    enoxaparin 40 mg Subcutaneous Daily Sonia Morocho PA-C    furosemide 20 mg Intravenous Daily Sonia Morocho PA-C    gabapentin 100 mg Oral BID Sonia Morocho PA-C    insulin glargine 25 Units Subcutaneous HS MEG Iyer    insulin lispro 1-6 Units Subcutaneous HS Sonia Morocho PA-C    insulin lispro 2-12 Units Subcutaneous TID AC Sonia Morocho PA-C    insulin lispro 8 Units Subcutaneous TID With Meals Sonia Morocho PA-C    metroNIDAZOLE 500 mg Oral Q8H [address] Sonia Morocho PA-C    pantoprazole 40 mg Oral BID AC Ivana Arana MD    saccharomyces boulardii 250 mg Oral BID Magdaleno Page PA-C         Today, Patient Was Seen By: MEG Donovan    ** Please Note: Dragon 360 Dictation voice to text software may have been used in the creation of this document   **

## 2019-11-27 NOTE — ASSESSMENT & PLAN NOTE
· Pt reports BRBPR in the toilet and then an episode of dark stool on day of admission, concerning for possible melena  · Hemoglobin 15 9  · On PPI BID   · Probiotic for diarrhea s/p abx therapy

## 2019-11-27 NOTE — SOCIAL WORK
CM contacted [facility] and was informed that the basaglar and humalog are covered with a copayment of $25  Glucometer is not covered and price is $32 99  Test strips are about $24  CM was informed that pt has already been made aware  CM was informed that physician made aware also because some of the meds had to be adjusted

## 2019-11-27 NOTE — ASSESSMENT & PLAN NOTE
· POA, as evidenced by tachycardia, leukocytosis, lactic acidosis and scrotal cellulitis on US, improving  · Pt reports history of scrotal cellulitis as outpatient, previously treated with amoxicillin with resolution   · ID following,  · Continue IV ancef and flagyl, continue, possible transition to PO in 24 hours  · Topical clotrimazole cream BID  · HgbA1c significantly elevated at 11 9%, likely etiology for recurrent scrotal infections   · Blood cultures x >48 hours negative  · UA negative   · Supportive care with PRN tylenol, pt denies any severe pain currently   · Continue to trend temps/WBC closely

## 2019-11-27 NOTE — ASSESSMENT & PLAN NOTE
· Newly diagnosed diabetes mellitus this admission with hgbA1c 11 9%, likely type 2  · Increase Lantus to 25 units QHS, scheduled humalog to 8 units TID and increase SSI algorithm coverage  · Glucose slowly improving, continues to be low 200s, monitor with adjustments  · Consider endocrinology consultation if no improvement noted on increased insulin coverage  · Will need endocrine follow up outpatient and insulin supplies on discharge  · Monitor QID glucose checks  · Continue consistent carb diet, nutrition consultation   · Pt also reports neuropathic pain, will trial gabapentin 100 mg BID and titrate as needed  · Attempt to obtain records from [facility]  as patient reports he recently had blood work there in May when he had a previous cellulitic infection   · Likely etiology for recurrent scrotal infections

## 2019-11-27 NOTE — PROGRESS NOTES
Progress Note - Infectious Disease   Selvin Uriostegui 47 y o  male MRN: [id number]  Unit/Bed#: -01 Encounter: [id number]      Impression/Plan:  1  Severe sepsis-POA   Tachycardia, leukocytosis, lactic acidosis   Appears to be secondary to a scrotal cellulitis which has been recurrent in nature  Creed Ovens has clinically improved with decreased heart rate, and is noted to have a decreased WBC count as well as resolution of the lactic acidosis   He seems to be tolerating the antibiotics without difficulty   I suspect the infection is streptococcal although other organisms are possible in the perineum   Thus far his blood cultures remain negative   -continue cefazolin Flagyl  -follow-up blood cultures  -recheck CBC with diff and creatinine   -possibly to oral antibiotics tomorrow if significant improvement noted in the cellulitis     2  Recurrent scrotal cellulitis-in the setting of newly diagnosed diabetes mellitus   Suspect this is certainly increase in the risk of there is recurrent bouts   Perhaps a component of tinea cruris is playing a role in leading to these bouts  The erythema has improved and the edema is modestly improved  -antibiotics as above  -clotrimazole cream twice daily to the groin and scrotum  -serial exams  -Re consult Urology to assist with scrotal edema measures such as sling or other support  -tighten diabetic control     3  Diabetes mellitus-type 2 with hyperglycemia   Newly diagnosed   Likely predisposing factor to the patient's recurrent cellulitis      4  Diarrhea-with possible bright red blood per rectum in the toilet an episode of dark stool   Unclear significance   Hemoglobin remains stable   Seems to have resolved but now the diarrhea is picked up since starting the antibiotics    But the diarrhea remains relatively mild and I suspect this is merely an antibiotic effect  -monitor stool output  -may need GI evaluation if bleeding occurs again  -if the diarrhea would significantly worsen consider checking stool for C diff     Discussed the above management plan with the primary service    Antibiotics:  Cefazolin 3  Flagyl 4  Antibiotics 5    Subjective:  Patient has no fever, chills, sweats; no nausea, vomiting, diarrhea; no cough, shortness of breath; no pain  No new symptoms  Objective:  Vitals:  Temp:  [97 6 °F (36 4 °C)-97 8 °F (36 6 °C)] 97 8 °F (36 6 °C)  HR:  [57-69] 57  Resp:  [18] 18  BP: (136-142)/(76-85) 141/85  SpO2:  [97 %-99 %] 98 %  Temp (24hrs), Av 7 °F (36 5 °C), Min:97 6 °F (36 4 °C), Max:97 8 °F (36 6 °C)  Current: Temperature: 97 8 °F (36 6 °C)    Physical Exam:   General Appearance:  Alert, interactive, nontoxic, no acute distress  Throat: Oropharynx moist without lesions  Lungs:   Clear to auscultation bilaterally; no wheezes, rhonchi or rales; respirations unlabored   Heart:  RRR; no murmur, rub or gallop   Abdomen:   Soft, non-tender, non-distended, positive bowel sounds  Genitourinary: Modestly decreased scrotal edema and significantly decreased erythema  Extremities: No clubbing, cyanosis or edema   Skin: No new rashes or lesions  No draining wounds noted         Labs, Imaging, & Other studies:   All pertinent labs and imaging studies were personally reviewed  Results from last 7 days   Lab Units 19  0642 19  1648 19  0548 19  0434   WBC Thousand/uL 9 43  --  9 00 12 91*   HEMOGLOBIN g/dL 14 7 15 3 13 4 15 0   PLATELETS Thousands/uL 203  --  164 188     Results from last 7 days   Lab Units 19  0641 19  0548 19  0434 19  1350   SODIUM mmol/L 142 141 136 136   POTASSIUM mmol/L 3 7 3 4* 3 7 3 8   CHLORIDE mmol/L 105 109* 101 98*   CO2 mmol/L 26 22 22 24   BUN mg/dL 8 9 13 13   CREATININE mg/dL 0 93 0 76 1 02 1 06   EGFR ml/min/1 73sq m 95 106 85 81   CALCIUM mg/dL 8 8 7 6* 8 5 9 1   AST U/L  --  14 25 49*   ALT U/L  --  48 73 108*   ALK PHOS U/L  --  51 69 86     Results from last 7 days   Lab Units 11/23/19  1554 11/23/19  1502   BLOOD CULTURE  No Growth at 72 hrs  No Growth at 72 hrs

## 2019-11-27 NOTE — RESPIRATORY THERAPY NOTE
11/26/19 2327   Non-Invasive Information   Interface Nasal mask   Non-Invasive Ventilation Mode BiPAP   SpO2 99 %   $ Pulse Oximetry Spot Check Charge Completed   Resp Comments pt placed on bipap for hs   Non-Invasive Settings   IPAP (cm) 17 cm   EPAP (cm) 14 cm   Rate (Set) 10   FiO2 (%) 21   Rise Time 3   Inspiratory Time (Set) 1   Non-Invasive Readings   Total Rate 17   MV (Mech) 14 1   Peak Pressure (Obs) 16 9   Spontaneous Vt (mL) 630   I/E Ratio (Obs) 1:1 4   Leak (lpm) 45 9   Skin Intervention Skin intact   Non-Invasive Alarms   Low Insp Pressure Time (sec) 60 sec   MV Low (L/min) 2   High Resp Rate (BPM) 45 BPM   Apnea Interval (sec) 20

## 2019-11-27 NOTE — RESPIRATORY THERAPY NOTE
11/27/19 0350   Non-Invasive Information   Interface Nasal mask   Non-Invasive Ventilation Mode BiPAP   $ CPAP/BiPAP Charge - Initial & Daily Mgmt Yes   SpO2 97 %   $ Pulse Oximetry Spot Check Charge Completed   Resp Comments pt asleep hunter well    Non-Invasive Settings   IPAP (cm) 17 cm   EPAP (cm) 14 cm   Rate (Set) 10   FiO2 (%) 21   Rise Time 3   Inspiratory Time (Set) 1   Non-Invasive Readings   Total Rate 14   MV (Mech) 9 5   Peak Pressure (Obs) 14 5   Spontaneous Vt (mL) 672   I/E Ratio (Obs) 1:1 4   Leak (lpm) 51 7   Skin Intervention Skin intact   Non-Invasive Alarms   Low Insp Pressure Time (sec) 60 sec   MV Low (L/min) 2   Vt Low (mL) 4   High Resp Rate (BPM) 45 BPM   Low Resp Rate (BPM) 8 BPM   Apnea Interval (sec) 20

## 2019-11-27 NOTE — ASSESSMENT & PLAN NOTE
· Pt reported multiple episodes of diarrhea s/p vacation in [facility]   · Lasted for 2 days and then resolved  · Reports persistent diarrhea, likely in setting of gastroenteritis verses antibiotics  · Continue probiotic  · ID following  · Denies any nausea/vomiting  · Continue to monitor stool output

## 2019-11-28 VITALS
RESPIRATION RATE: 18 BRPM | TEMPERATURE: 97.9 F | OXYGEN SATURATION: 95 % | HEART RATE: 72 BPM | SYSTOLIC BLOOD PRESSURE: 121 MMHG | HEIGHT: 73 IN | DIASTOLIC BLOOD PRESSURE: 75 MMHG | BODY MASS INDEX: 30.97 KG/M2 | WEIGHT: 233.69 LBS

## 2019-11-28 LAB
ANION GAP SERPL CALCULATED.3IONS-SCNC: 6 MMOL/L (ref 4–13)
BACTERIA BLD CULT: NORMAL
BACTERIA BLD CULT: NORMAL
BUN SERPL-MCNC: 8 MG/DL (ref 5–25)
CALCIUM SERPL-MCNC: 9 MG/DL (ref 8.3–10.1)
CHLORIDE SERPL-SCNC: 105 MMOL/L (ref 100–108)
CO2 SERPL-SCNC: 31 MMOL/L (ref 21–32)
CREAT SERPL-MCNC: 0.98 MG/DL (ref 0.6–1.3)
ERYTHROCYTE [DISTWIDTH] IN BLOOD BY AUTOMATED COUNT: 12.5 % (ref 11.6–15.1)
GFR SERPL CREATININE-BSD FRML MDRD: 90 ML/MIN/1.73SQ M
GLUCOSE SERPL-MCNC: 143 MG/DL (ref 65–140)
GLUCOSE SERPL-MCNC: 151 MG/DL (ref 65–140)
HCT VFR BLD AUTO: 44.4 % (ref 36.5–49.3)
HGB BLD-MCNC: 15 G/DL (ref 12–17)
MCH RBC QN AUTO: 30.2 PG (ref 26.8–34.3)
MCHC RBC AUTO-ENTMCNC: 33.8 G/DL (ref 31.4–37.4)
MCV RBC AUTO: 90 FL (ref 82–98)
PLATELET # BLD AUTO: 274 THOUSANDS/UL (ref 149–390)
PMV BLD AUTO: 9.2 FL (ref 8.9–12.7)
POTASSIUM SERPL-SCNC: 3.8 MMOL/L (ref 3.5–5.3)
RBC # BLD AUTO: 4.96 MILLION/UL (ref 3.88–5.62)
SODIUM SERPL-SCNC: 142 MMOL/L (ref 136–145)
WBC # BLD AUTO: 7.73 THOUSAND/UL (ref 4.31–10.16)

## 2019-11-28 PROCEDURE — 85027 COMPLETE CBC AUTOMATED: CPT | Performed by: NURSE PRACTITIONER

## 2019-11-28 PROCEDURE — 80048 BASIC METABOLIC PNL TOTAL CA: CPT | Performed by: NURSE PRACTITIONER

## 2019-11-28 PROCEDURE — 99238 HOSP IP/OBS DSCHRG MGMT 30/<: CPT | Performed by: NURSE PRACTITIONER

## 2019-11-28 PROCEDURE — 82948 REAGENT STRIP/BLOOD GLUCOSE: CPT

## 2019-11-28 PROCEDURE — 99233 SBSQ HOSP IP/OBS HIGH 50: CPT | Performed by: INTERNAL MEDICINE

## 2019-11-28 RX ORDER — AMOXICILLIN AND CLAVULANATE POTASSIUM 875; 125 MG/1; MG/1
1 TABLET, FILM COATED ORAL EVERY 12 HOURS SCHEDULED
Qty: 16 TABLET | Refills: 0 | Status: SHIPPED | OUTPATIENT
Start: 2019-11-28 | End: 2019-12-06

## 2019-11-28 RX ORDER — AMOXICILLIN AND CLAVULANATE POTASSIUM 875; 125 MG/1; MG/1
1 TABLET, FILM COATED ORAL EVERY 12 HOURS SCHEDULED
Status: DISCONTINUED | OUTPATIENT
Start: 2019-11-28 | End: 2019-11-28 | Stop reason: HOSPADM

## 2019-11-28 RX ADMIN — CEFAZOLIN SODIUM 2000 MG: 2 SOLUTION INTRAVENOUS at 04:56

## 2019-11-28 RX ADMIN — CLOTRIMAZOLE: 1 CREAM TOPICAL at 08:50

## 2019-11-28 RX ADMIN — AMOXICILLIN AND CLAVULANATE POTASSIUM 1 TABLET: 875; 125 TABLET, FILM COATED ORAL at 11:06

## 2019-11-28 RX ADMIN — METRONIDAZOLE 500 MG: 500 TABLET ORAL at 05:01

## 2019-11-28 RX ADMIN — ENOXAPARIN SODIUM 40 MG: 40 INJECTION SUBCUTANEOUS at 08:50

## 2019-11-28 RX ADMIN — PANTOPRAZOLE SODIUM 40 MG: 40 TABLET, DELAYED RELEASE ORAL at 05:01

## 2019-11-28 RX ADMIN — GABAPENTIN 100 MG: 100 CAPSULE ORAL at 08:44

## 2019-11-28 RX ADMIN — FUROSEMIDE 20 MG: 10 INJECTION, SOLUTION INTRAMUSCULAR; INTRAVENOUS at 08:45

## 2019-11-28 RX ADMIN — Medication 250 MG: at 08:44

## 2019-11-28 NOTE — PLAN OF CARE
Problem: INFECTION - ADULT  Goal: Absence or prevention of progression during hospitalization  Description  INTERVENTIONS:  - Assess and monitor for signs and symptoms of infection  - Monitor lab/diagnostic results  - Monitor all insertion sites, i e  indwelling lines, tubes, and drains  - Monitor endotracheal if appropriate and nasal secretions for changes in amount and color  - [place] appropriate cooling/warming therapies per order  - Administer medications as ordered  - Instruct and encourage patient and family to use good hand hygiene technique  - Identify and instruct in appropriate isolation precautions for identified infection/condition  Outcome: Progressing  Goal: Absence of fever/infection during neutropenic period  Description  INTERVENTIONS:  - Monitor WBC    Outcome: Progressing     Problem: SAFETY ADULT  Goal: Patient will remain free of falls  Description  INTERVENTIONS:  - Assess patient frequently for physical needs  -  Identify cognitive and physical deficits and behaviors that affect risk of falls    -  [place] fall precautions as indicated by assessment   - Educate patient/family on patient safety including physical limitations  - Instruct patient to call for assistance with activity based on assessment  - Modify environment to reduce risk of injury  - Consider OT/PT consult to assist with strengthening/mobility  Outcome: Progressing  Goal: Maintain or return to baseline ADL function  Description  INTERVENTIONS:  -  Assess patient's ability to carry out ADLs; assess patient's baseline for ADL function and identify physical deficits which impact ability to perform ADLs (bathing, care of mouth/teeth, toileting, grooming, dressing, etc )  - Assess/evaluate cause of self-care deficits   - Assess range of motion  - Assess patient's mobility; develop plan if impaired  - Assess patient's need for assistive devices and provide as appropriate  - Encourage maximum independence but intervene and supervise when necessary  - Involve family in performance of ADLs  - Assess for home care needs following discharge   - Consider OT consult to assist with ADL evaluation and planning for discharge  - Provide patient education as appropriate  Outcome: Progressing  Goal: Maintain or return mobility status to optimal level  Description  INTERVENTIONS:  - Assess patient's baseline mobility status (ambulation, transfers, stairs, etc )    - Identify cognitive and physical deficits and behaviors that affect mobility  - Identify mobility aids required to assist with transfers and/or ambulation (gait belt, sit-to-stand, lift, walker, cane, etc )  - [place] fall precautions as indicated by assessment  - Record patient progress and toleration of activity level on Mobility SBAR; progress patient to next Phase/Stage  - Instruct patient to call for assistance with activity based on assessment  - Consider rehabilitation consult to assist with strengthening/weightbearing, etc   Outcome: Progressing     Problem: DISCHARGE PLANNING  Goal: Discharge to home or other facility with appropriate resources  Description  INTERVENTIONS:  - Identify barriers to discharge w/patient and caregiver  - Arrange for needed discharge resources and transportation as appropriate  - Identify discharge learning needs (meds, wound care, etc )  - Arrange for interpretive services to assist at discharge as needed  - Refer to Case Management Department for coordinating discharge planning if the patient needs post-hospital services based on physician/advanced practitioner order or complex needs related to functional status, cognitive ability, or social support system  Outcome: Progressing     Problem: SAFETY ADULT  Goal: Maintain or return to baseline ADL function  Description  INTERVENTIONS:  -  Assess patient's ability to carry out ADLs; assess patient's baseline for ADL function and identify physical deficits which impact ability to perform ADLs (bathing, care of mouth/teeth, toileting, grooming, dressing, etc )  - Assess/evaluate cause of self-care deficits   - Assess range of motion  - Assess patient's mobility; develop plan if impaired  - Assess patient's need for assistive devices and provide as appropriate  - Encourage maximum independence but intervene and supervise when necessary  - Involve family in performance of ADLs  - Assess for home care needs following discharge   - Consider OT consult to assist with ADL evaluation and planning for discharge  - Provide patient education as appropriate  Outcome: Progressing     Problem: DISCHARGE PLANNING  Goal: Discharge to home or other facility with appropriate resources  Description  INTERVENTIONS:  - Identify barriers to discharge w/patient and caregiver  - Arrange for needed discharge resources and transportation as appropriate  - Identify discharge learning needs (meds, wound care, etc )  - Arrange for interpretive services to assist at discharge as needed  - Refer to Case Management Department for coordinating discharge planning if the patient needs post-hospital services based on physician/advanced practitioner order or complex needs related to functional status, cognitive ability, or social support system  Outcome: Progressing     Problem: Knowledge Deficit  Goal: Patient/family/caregiver demonstrates understanding of disease process, treatment plan, medications, and discharge instructions  Description  Complete learning assessment and assess knowledge base    Interventions:  - Provide teaching at level of understanding  - Provide teaching via preferred learning methods  Outcome: Progressing

## 2019-11-28 NOTE — DISCHARGE SUMMARY
[facility]  Discharge- Sally True 1970, 47 y o  male MRN: [id number]    Unit/Bed#: -01 Encounter: [id number]    Primary Care Provider: Sara Selby   Date and time admitted to hospital: 11/23/2019 12:33 PM    * Severe sepsis [facility])  Assessment & Plan  · POA, as evidenced by tachycardia, leukocytosis, lactic acidosis and scrotal cellulitis on US, improving  · Pt reports history of scrotal cellulitis as outpatient, previously treated with amoxicillin with resolution   · ID following,  · Completed course of Flagyl, transition to Augmentin, discharged on Augmentin till 12/6  · Topical clotrimazole cream BID  · HgbA1c significantly elevated at 11 9%, likely etiology for recurrent scrotal infections   · Blood cultures x >48 hours negative  · Continue scrotal sling  · Continue to trend temps/WBC closely     Cellulitis of groin  Assessment & Plan  · Pt reports edema and warmth, denies any significant pain   · Scrotal US with scrotal wall thickening, edema and inflammation, testes appear normal  No abscess formation noted     · Pt reports recurrent episodes, previously treated with amoxicillin outpatient without resolution   · Underlying uncontrolled diabetes likely cause of recurrent infections   · Urology following,  · Continue scrotal support, elevation, ice, olive oil  · ID following,  · Augmentin and added clotrimazole cream BID to groin and scrotum     Diabetes mellitus with hyperglycemia ([facility] [address] )  Assessment & Plan  · Newly diagnosed diabetes mellitus this admission with hgbA1c 11 9%, likely type 2  · Increase Lantus to 25 units QHS, scheduled humalog to 8 units TID and increase SSI algorithm coverage  · Blood sugars is significantly improved  · Consider endocrinology consultation if no improvement noted on increased insulin coverage  · Will need endocrine follow up outpatient and insulin supplies on discharge  · Monitor QID glucose checks  · Continue consistent carb diet, nutrition education  · Pt also reports neuropathic pain, will trial gabapentin 100 mg BID and titrate as needed  · Extensive education provided to patient from [address] [place] [address] and nursing staff patient verbalizes understanding and has mild confidence and his ability to manage his diabetes on his own  He has been educated about my chart, he understands that he has follow-up appointments with PCP and endocrine  · Likely etiology for recurrent scrotal infections     Melena  Assessment & Plan  · Pt reports BRBPR in the toilet and then an episode of dark stool on day of admission, concerning for possible melena  · Hemoglobin 15 9  · On PPI BID   · Probiotic for diarrhea s/p abx therapy     Gastroenteritis  Assessment & Plan  · Pt reported multiple episodes of diarrhea s/p vacation in [facility]   · Lasted for 2 days and then resolved  · Continue probiotics    AYLA (obstructive sleep apnea)  Assessment & Plan  · Pt requires Bipap at night  · Continue here       Discharging Physician / Practitioner: MEG Shields  PCP: Marixa Younger  Admission Date:   Admission Orders (From admission, onward)     Ordered        11/23/19 1559  Inpatient Admission  Once                   Discharge Date: 11/28/19    Resolved Problems  Date Reviewed: 11/25/2019          Resolved    Transaminitis 11/24/2019     Resolved by  Nat Toney PA-C          Consultations During Hospital Stay:  · Infectious Disease  · Urology    Procedures Performed:   · Ultrasound of scrotum    Significant Findings / Test Results:   1  Scrotal wall thickening, edema, and inflammation  2  Normal testes  Incidental Findings:   · None     Test Results Pending at Discharge (will require follow up): · None     Outpatient Tests Requested:  · None    Complications:  None    Reason for Admission:  None    Hospital Course:     Reina Brown is a 47 y o  male patient who originally presented to the hospital on 11/23/2019 due to scrotal cellulitis    Patient has had increased redness and swelling however does not have any discomfort  He was newly found to have diabetes on this admission with an elevated hemoglobin A1c  This is the diagnosis  He has had extensive education provided to him from nutrition, nursing, AP  He has been very receptive to all of his teaching and he is very motivated to learn and manage his new disease  He has a good support system  He is in endocrine office and PCP office visit  His scrotal swelling and redness has significantly improved    Please see above list of diagnoses and related plan for additional information  Condition at Discharge: stable     Discharge Day Visit / Exam:     Subjective:  Denies any chest pain chest tightness shortness of breath or difficulty breathing  He reports significant his swelling edema  He is ambulating without difficulty he  denies any pain  He is very eager for discharge to return home with his family  Understands the importance of managing his diabetes appropriately and he is eager and motivated to do so  Vitals: Blood Pressure: 121/75 (11/28/19 0700)  Pulse: 72 (11/28/19 0700)  Temperature: 97 9 °F (36 6 °C) (11/28/19 0700)  Temp Source: Oral (11/28/19 0700)  Respirations: 18 (11/28/19 0700)  Height: 6' 1" (185 4 cm) (11/25/19 1508)  Weight - Scale: 106 kg (233 lb 11 oz) (11/25/19 1508)  SpO2: 95 % (11/28/19 0700)  Exam:   Physical Exam   Constitutional: He is oriented to person, place, and time  He appears well-developed and well-nourished  HENT:   Head: Normocephalic  Eyes: Pupils are equal, round, and reactive to light  Neck: Normal range of motion  Neck supple  Cardiovascular: Normal rate  Pulmonary/Chest: Effort normal    Abdominal: Soft  Bowel sounds are normal    Musculoskeletal: Normal range of motion  Neurological: He is alert and oriented to person, place, and time  Skin: Skin is warm and dry  Psychiatric: He has a normal mood and affect   His behavior is normal  Judgment and thought content normal    Nursing note and vitals reviewed  Discussion with Family:  Educated patient extensively on current plan of care    Discharge instructions/Information to patient and family:   See after visit summary for information provided to patient and family  Provisions for Follow-Up Care:  See after visit summary for information related to follow-up care and any pertinent home health orders  Disposition:     Home    For Discharges to [facility]:   · Not Applicable to this Patient - Not Applicable to this Patient    Planned Readmission:  No     Discharge Statement:  I spent 30 minutes discharging the patient  This time was spent on the day of discharge  I had direct contact with the patient on the day of discharge  Greater than 50% of the total time was spent examining patient, answering all patient questions, arranging and discussing plan of care with patient as well as directly providing post-discharge instructions  Additional time then spent on discharge activities  Discharge Medications:  See after visit summary for reconciled discharge medications provided to patient and family        ** Please Note: This note has been constructed using a voice recognition system **

## 2019-11-28 NOTE — ASSESSMENT & PLAN NOTE
· Pt reports edema and warmth, denies any significant pain   · Scrotal US with scrotal wall thickening, edema and inflammation, testes appear normal  No abscess formation noted     · Pt reports recurrent episodes, previously treated with amoxicillin outpatient without resolution   · Underlying uncontrolled diabetes likely cause of recurrent infections   · Urology following,  · Continue scrotal support, elevation, ice, olive oil  · ID following,  · Augmentin and added clotrimazole cream BID to groin and scrotum

## 2019-11-28 NOTE — ASSESSMENT & PLAN NOTE
· Pt reported multiple episodes of diarrhea s/p vacation in [facility]   · Lasted for 2 days and then resolved  · Continue probiotics

## 2019-11-28 NOTE — PROGRESS NOTES
Progress Note - Infectious Disease   Sam Foreman 47 y o  male MRN: [id number]  Unit/Bed#: -01 Encounter: [id number]      Impression/Plan:  1  Severe sepsis-POA   Tachycardia, leukocytosis, lactic acidosis   Appears to be secondary to a scrotal cellulitis which has been recurrent in nature  Sherryle Elbe has clinically improved with decreased heart rate, and is noted to have a decreased WBC count as well as resolution of the lactic acidosis   He seems to be tolerating the antibiotics without difficulty   I suspect the infection is streptococcal although other organisms are possible in the perineum   Thus far his blood cultures remain negative  He is much improved   -discontinue cefazolin Flagyl  -Augmentin 875 mg p o  Q 12 hours through 12/6/2019 to complete 2 weeks total treatment  -management of recurrent scrotal cellulitis as below     2  Recurrent scrotal cellulitis-in the setting of newly diagnosed diabetes mellitus   Suspect this is certainly increase in the risk of there is recurrent bouts   Perhaps a component of tinea cruris is playing a role in leading to these bouts   Erythema and edema have significantly improved  -antibiotics as above  -clotrimazole cream twice daily to the groin and scrotum to complete 3 weeks total  -serial exams  -edema control management with scrotal support  -more aggressive diabetic control     3   Diabetes mellitus-type 2 with hyperglycemia   Newly diagnosed   Likely predisposing factor to the patient's recurrent cellulitis      4  Diarrhea-with possible bright red blood per rectum in the toilet an episode of dark stool   Unclear significance   Hemoglobin remains stable   Seems to have resolved but now the diarrhea is picked up since starting the antibiotics   But the diarrhea remains relatively mild and I suspect this is merely an antibiotic effect  -monitor stool output  -may need GI evaluation if bleeding occurs again  -if the diarrhea would significantly worsen consider checking stool for C diff     Discussed the above management plan with the primary service    I spent 40 minutes on the unit floor of which 25 minutes was in counseling/coordination of care as outlined in my note including a prolonged discussion with the patient to discuss preventative modalities for recurrent infection including better diabetic control, edema control, and skin care    Antibiotics:  Cefazolin 4  Flagyl 5  Antibiotics 6  Subjective:  Patient has no fever, chills, sweats; no nausea, vomiting, diarrhea; no cough, shortness of breath; no pain  No new symptoms  He feels better with decreased scrotal swelling    Objective:  Vitals:  Temp:  [97 6 °F (36 4 °C)-97 9 °F (36 6 °C)] 97 9 °F (36 6 °C)  HR:  [65-72] 72  Resp:  [18] 18  BP: (119-137)/(74-76) 121/75  SpO2:  [94 %-95 %] 95 %  Temp (24hrs), Av 8 °F (36 6 °C), Min:97 6 °F (36 4 °C), Max:97 9 °F (36 6 °C)  Current: Temperature: 97 9 °F (36 6 °C)    Physical Exam:   General Appearance:  Alert, interactive, nontoxic, no acute distress  Throat: Oropharynx moist without lesions  Lungs:   Clear to auscultation bilaterally; no wheezes, rhonchi or rales; respirations unlabored   Heart:  RRR; no murmur, rub or gallop   Abdomen:   Soft, non-tender, non-distended, positive bowel sounds  Genitourinary: Decreased scrotal edema and erythema   Extremities: No clubbing, cyanosis or edema   Skin: No new rashes or lesions  No draining wounds noted         Labs, Imaging, & Other studies:   All pertinent labs and imaging studies were personally reviewed  Results from last 7 days   Lab Units 19  0505 19  1057 19  0642   WBC Thousand/uL 7 73 7 31 9 43   HEMOGLOBIN g/dL 15 0 15 9 14 7   PLATELETS Thousands/uL 274 251 203     Results from last 7 days   Lab Units 19  0505 19  1057 19  0641 19  0548 19  0434 19  1350   SODIUM mmol/L 142 142 142 141 136 136   POTASSIUM mmol/L 3 8 3 5 3 7 3 4* 3 7 3 8   CHLORIDE mmol/L 105 103 105 109* 101 98*   CO2 mmol/L 31 30 26 22 22 24   BUN mg/dL 8 8 8 9 13 13   CREATININE mg/dL 0 98 0 97 0 93 0 76 1 02 1 06   EGFR ml/min/1 73sq m 90 91 95 106 85 81   CALCIUM mg/dL 9 0 9 1 8 8 7 6* 8 5 9 1   AST U/L  --   --   --  14 25 49*   ALT U/L  --   --   --  48 73 108*   ALK PHOS U/L  --   --   --  51 69 86     Results from last 7 days   Lab Units 11/23/19  1554 11/23/19  1502   BLOOD CULTURE  No Growth After 4 Days  No Growth After 4 Days

## 2019-11-28 NOTE — ASSESSMENT & PLAN NOTE
· POA, as evidenced by tachycardia, leukocytosis, lactic acidosis and scrotal cellulitis on US, improving  · Pt reports history of scrotal cellulitis as outpatient, previously treated with amoxicillin with resolution   · ID following,  · Completed course of Flagyl, transition to Augmentin, discharged on Augmentin till 12/6  · Topical clotrimazole cream BID  · HgbA1c significantly elevated at 11 9%, likely etiology for recurrent scrotal infections   · Blood cultures x >48 hours negative  · Continue scrotal sling  · Continue to trend temps/WBC closely

## 2019-11-28 NOTE — DISCHARGE INSTRUCTIONS
Cellulitis, Ambulatory Care   Brant Sahu , Christina BARRIGA , et al: Practice guidelines for the diagnosis and management of skin and soft tissue infections: 2014 update by the infectious diseases society of [place]  Clin Infect Dis 2014; 59(2):e10-e52  Warren P: Management of common bacterial infections of the skin  [facility]; 21(2):122-128  Pat RB & Fede T: Imaging modalities in wounds and superficial skin infections  Emerg Med Clin Tyra Am 2007; 25(1):223-234  Carl [place]: Community-acquired methicillin-resistant Staphylococcus aureus skin infections: implications for patients and practitioners  Am J Clin Dermatol 2007; 8(5):259-270  Margie-Chiki M & Jeff NIKKY: Acute bacterial skin infections and cellulitis  [facility] 2007; 20(2):118-123  Yifan Rivera & Timi KG: Wound assessment  BMJ 2006; 332(0172):285-288  Nakul Mcmanus PW, Irving LM, et al: The appropriate use of antibiotics in surgery: a review of surgical infections  Curr Probl Surg 2007; 21(89):639-123  Janis Luis ME, & Beto LP: Approach to the patient with presumed cellulitis  [facility] [facility] 2007; 26(3):168-178  Jordon Blanco: Bacterial infections: uncommon presentations  Clin Dermatol 2005; 23(5):503-508  [facility]: Are blood cultures necessary in adults with cellulitis? [facility] 2005; 10(5):117-977  Rommel CASTILLO: Cellulitis and erysipelas  Clin Evid 2006; 5977(51):5029-3855  [address] [place] [address] [address]: Diagnosis and management of Staphylococcus aureus infections of the skin and soft tissue  Intern Med J 2005; 35 Suppl 2:  Héctor MULTANI & Darron NG: Skin and soft tissue infections  Prim Care 2006; 33(3):697-710  Brant Sahu, Christina BARRIGA, et al: Practice guidelines for the diagnosis and management of skin and soft-tissue infections  Clin Infect Dis 2005; 41(10):5135-2941    © 2014 [address] is for End User's use only and may not be sold, redistributed or otherwise used for commercial purposes  All illustrations and images included in CareNotes® are the copyrighted property of A D A M , Inc  or Jaspreet Encarnacion  The above information is an  only  It is not intended as medical advice for individual conditions or treatments  Talk to your doctor, nurse or pharmacist before following any medical regimen to see if it is safe and effective for you  Complications of Infection   AMBULATORY CARE:   Complications of infection  can happen if an infection is not diagnosed and treated early  Some infections may have complications even when they are treated early  The infection can spread from one place in your body to the entire body through your bloodstream  Early diagnosis and treatment may prevent complications such as bacteremia, sepsis, and septic shock  These are serious, life-threatening conditions that need immediate treatment  · Bacteremia  is when there is bacteria in the blood  Bacteremia can happen when infections in other parts of the body, such as the lungs, kidneys, or skin, travel to the blood  It can also happen when indwelling catheters, such as a central venous access devices, pacemaker wires, or urinary catheters become infected  A central venous access device is a special IV that is placed in a large vein and left there for an extended period of time  · Sepsis  happens when an infection spreads and causes the body to react strongly to the germs  The body's defense system normally releases chemicals to fight off infection at the infected area  In sepsis, chemicals are released throughout the body  The chemicals cause inflammation and can cause clotting in small blood vessels that is difficult to control  Inflammation and clotting decreases blood flow and oxygen to organs  This may cause them to stop working correctly   Sepsis is also called systemic inflammatory response syndrome (SIRS) due to infection  · Septic shock  is a severe type of sepsis that happens as sepsis gets worse and causes multiple organs to shut down  The blood pressure drops very low and organs do not get enough blood  This may cause permanent damage to organs  Signs and symptoms that an infection has become worse:   · Fever or very low body temperature with chills and violent shaking    · Swelling in the ankles or legs    · A change in mental status such as confusion, loss of consciousness, or seizures    · A fast or irregular heartbeat    · Urinating very little or not at all     · Difficulty breathing, dizziness, or weakness    · A rash or warm, red skin  Call 911 or have someone else call for any of the following:   · You have any of the following signs of a heart attack:      ¨ Squeezing, pressure, or pain in your chest that lasts longer than 5 minutes or returns    ¨ Discomfort or pain in your back, neck, jaw, stomach, or arm     ¨ Trouble breathing    ¨ Nausea or vomiting    ¨ Lightheadedness or a sudden cold sweat, especially with chest pain or trouble breathing    · You have a seizure or lose consciousness  · You have trouble breathing  · Your lips or fingernails are blue  · You feel extremely weak and have a hard time moving  Seek care immediately if:   · Your symptoms, such as fever, get worse, even if you are taking medicine to treat the infection  · You have increased swelling in your legs, feet, or abdomen  · You feel weak, dizzy, or faint  · You stop urinating or urinate very little  Contact your healthcare provider if:   · You have questions or concerns about your condition or care  Treatment  may depend on how severe the complications are  You may need monitoring and treatment in the hospital  You may  need any of the following:  · Removal or change of a catheter  may be needed to get rid of the infection       · Medicines  may be given to increase your blood pressure and blood flow to your organs  Antibiotics may be given to treat an infection  Medicines may also be given to decrease inflammation, control your blood sugar, prevent stomach ulcers, and prevent blood clots  · Surgery or other procedures  may be needed to treat problems causing sepsis or related to the complications of your infection  This may include draining an abscess or removing infected tissue  Follow up with your healthcare provider as directed:  Write down your questions so you remember to ask them during your visits  © 2017 [facility] Information is for End User's use only and may not be sold, redistributed or otherwise used for commercial purposes  All illustrations and images included in CareNotes® are the copyrighted property of A D A M , Inc  or Jaspreet Encarnacion  The above information is an  only  It is not intended as medical advice for individual conditions or treatments  Talk to your doctor, nurse or pharmacist before following any medical regimen to see if it is safe and effective for you  10% - bad control"> 10% - bad control,Hemoglobin A1c (HbA1c) greater than 10% indicating poor diabetic control,Haemoglobin A1c greater than 10% indicating poor diabetic control">   Diabetes Mellitus Type 2 in Adults, Ambulatory Care   GENERAL INFORMATION:   Diabetes mellitus type 2  is a disease that affects how your body uses glucose (sugar)  Insulin helps move sugar out of the blood so it can be used for energy  Normally, when the blood sugar level increases, the pancreas makes more insulin  Type 2 diabetes develops because either the body cannot make enough insulin, or it cannot use the insulin correctly  After many years, your pancreas may stop making insulin    Common symptoms include the following:   · More hunger or thirst than usual     · Frequent urination     · Weight loss without trying     · Blurred vision  Seek immediate care for the following symptoms:   · Severe abdominal pain, or pain that spreads to your back  You may also be vomiting  · Trouble staying awake or focusing    · Shaking or sweating    · Blurred or double vision    · Breath has a fruity, sweet smell    · Breathing is deep and labored, or rapid and shallow    · Heartbeat is fast and weak  Treatment for diabetes mellitus type 2  includes keeping your blood sugar at a normal level  You must eat the right foods, and exercise regularly  You may also need medicine if you cannot control your blood sugar level with nutrition and exercise  Manage diabetes mellitus type 2:   · Check your blood sugar level  You will be taught how to check a small drop of blood in a glucose monitor  Ask your healthcare provider when and how often to check during the day  Ask your healthcare provider what your blood sugar levels should be when you check them  · Keep track of carbohydrates (sugar and starchy foods)  Your blood sugar level can get too high if you eat too many carbohydrates  Your dietitian will help you plan meals and snacks that have the right amount of carbohydrates  · Eat low-fat foods  Some examples are skinless chicken and low-fat milk  · Eat less sodium (salt)  Some examples of high-sodium foods to limit are soy sauce, potato chips, and soup  Do not add salt to food you cook  Limit your use of table salt  · Eat high-fiber foods  Foods that are a good source of fiber include vegetables, whole grain bread, and beans  · Limit alcohol  Alcohol affects your blood sugar level and can make it harder to manage your diabetes  Women should limit alcohol to 1 drink a day  Men should limit alcohol to 2 drinks a day  A drink of alcohol is 12 ounces of beer, 5 ounces of wine, or 1½ ounces of liquor  · Get regular exercise  Exercise can help keep your blood sugar level steady, decrease your risk of heart disease, and help you lose weight  Exercise for at least 30 minutes, 5 days a week   Include muscle strengthening activities 2 days each week  Work with your healthcare provider to create an exercise plan  · Check your feet each day  for injuries or open sores  Ask your healthcare provider for activities you can do if you have an open sore  · Quit smoking  If you smoke, it is never too late to quit  Smoking can worsen the problems that may occur with diabetes  Ask your healthcare provider for information about how to stop smoking if you are having trouble quitting  · Ask about your weight:  Ask healthcare providers if you need to lose weight, and how much to lose  Ask them to help you with a weight loss program  Even a 10 to 15 pound weight loss can help you manage your blood sugar level  · Carry medical alert identification  Wear medical alert jewelry or carry a card that says you have diabetes  Ask your healthcare provider where to get these items  · Ask about vaccines  Diabetes puts you at risk of serious illness if you get the flu, pneumonia, or hepatitis  Ask your healthcare provider if you should get a flu, pneumonia, or hepatitis B vaccine, and when to get the vaccine  Follow up with your healthcare provider as directed:  Write down your questions so you remember to ask them during your visits  CARE AGREEMENT:   You have the right to help plan your care  Learn about your health condition and how it may be treated  Discuss treatment options with your caregivers to decide what care you want to receive  You always have the right to refuse treatment  The above information is an  only  It is not intended as medical advice for individual conditions or treatments  Talk to your doctor, nurse or pharmacist before following any medical regimen to see if it is safe and effective for you  © 2014 [address] is for End User's use only and may not be sold, redistributed or otherwise used for commercial purposes   All illustrations and images included in CareNotes® are the copyrighted property of A D A M , Inc  or Jaspreet Encarnacion  Diabetic Hyperglycemia, Ambulatory Care   American Diabetes Association : Standards of medical care in diabetes--2014  Diabetes Care 2014; 40 Suppl 1:S14-S80  American Diabetes Association: Hyperglycemia (High blood glucose)  American Diabetes Association  [facility]  2014  Available from URL: [email or web address]  As accessed 2014-03-25  [facility]: Diabetes and Hyperglycemia  In: Kaleyrimeron PeñaSanta Cruz, ed  Emergency Medicine, 2nd ed  [address], [place], [place], 2012  Inkaren SE, Fernando RM, Jonathan BENSON, et al: Management of hyperglycemia in type 2 diabetes: a patient-centered approach: position statement of the American Diabetes Association (ADA) and the  Association for the Study of Diabetes (EASD)  Diabetes Care 2012; 35(6):7605-1445  © 2014 [address] is for End User's use only and may not be sold, redistributed or otherwise used for commercial purposes  All illustrations and images included in CareNotes® are the copyrighted property of A D A M , Inc  or Jaspreet Encarnacion  The above information is an  only  It is not intended as medical advice for individual conditions or treatments  Talk to your doctor, nurse or pharmacist before following any medical regimen to see if it is safe and effective for you  Diabetic Hyperglycemia, Ambulatory Care   GENERAL INFORMATION:   Diabetic hyperglycemia  is a blood glucose (sugar) level that is higher than your healthcare provider recommends  You may not have any signs and symptoms   You may have increased thirst and urinate more often than usual   Seek immediate care for the following symptoms:   · Shortness of breath    · Breath that smells fruity    · Nausea and vomiting    · Symptoms of dehydration, such as dark yellow urine, dry mouth and lips, and dry skin  Manage diabetic hyperglycemia:   · If you take diabetes medicine or insulin, take it as directed  Missed or wrong doses can cause your blood sugar to go up  · Tell your healthcare provider if you continue to have trouble managing your blood sugar  He may change the type, amount, or timing of your diabetes medicine or insulin  If you do not take diabetes medicine or insulin, you may need to start  · Work with your healthcare provider to develop a sick day plan  Illness can cause your blood sugar to rise  A sick day plan helps you control your blood sugar level when you are sick  Prevent diabetic hyperglycemia:   · Check your blood sugar levels regularly  Ask your healthcare provider how often to check your blood sugar and what your levels should be  · Follow your meal plan  Your blood sugar can go up if you eat a large meal or you eat more carbohydrates than recommended  Work with a dietitian to develop a meal plan that is right for you  · Exercise  can help lower your blood sugar when it is high  It can also keep your blood sugar levels steady over time  Exercise for at least 30 minutes, 5 days a week  Include muscle strengthening activities 2 days each week  Work with your healthcare provider to create an exercise plan  Children should get at least 60 minutes of physical activity each day  · Check your ketones before exercise  if your blood sugar level is above 240 mg/dl  Do not exercise if you have ketones in your urine, because your blood sugar level may rise even more  Ask your healthcare provider how to lower your blood sugar when you have ketones  Follow up with your healthcare provider as directed:  Write down your questions so you remember to ask them during your visits  CARE AGREEMENT:   You have the right to help plan your care  Learn about your health condition and how it may be treated   Discuss treatment options with your caregivers to decide what care you want to receive  You always have the right to refuse treatment  The above information is an  only  It is not intended as medical advice for individual conditions or treatments  Talk to your doctor, nurse or pharmacist before following any medical regimen to see if it is safe and effective for you  © 2014 [address] is for End User's use only and may not be sold, redistributed or otherwise used for commercial purposes  All illustrations and images included in CareNotes® are the copyrighted property of A D A M , Inc  or Jaspreet Alysha  Diabetes in the Older Adult   AMBULATORY CARE:   What you need to know if you are an older adult with diabetes:  Older adults with diabetes are at risk for heart disease, stroke, kidney disease, blindness, and nerve damage  You may also be at risk for any of the following:  · Poor nutrition or low blood sugar levels    · Confusion or problems with memory, attention, or learning new things    · Trouble controlling urination or frequent urinary tract infections    · Trouble with coordination or balance    · Falls and injuries    · Pain    · Depression    · Open sores on your legs or feet  The ABCs of diabetes: The ABCs stand for certain things you can do to manage or prevent problems caused by diabetes:  · A  stands for A1c test   This test shows the average amount of sugar in your blood over the past 2 to 3 months  High levels of sugar in your blood can cause damage to your heart, blood vessels, kidneys, feet, and eyes  Most older adults with diabetes should have an A1c level less than 7 5  Ask your healthcare provider if this A1c goal is right for you  Your provider can help you make changes if your A1c is too high  · B  stands for blood pressure   High blood pressure can increase your risk for a heart attack, stroke, or kidney disease   Most older adults with diabetes should have a systolic blood pressure (first number) of 749  Your diastolic blood pressure (second number) should be below 90  Ask your healthcare provider if these blood pressure goals are right for you  · C  stands for cholesterol   High levels of cholesterol can block your arteries and cause a heart attack or stroke  Ask your healthcare provider what your cholesterol levels should be  · S  stands for stop smoking   Nicotine and other chemicals in cigarettes and cigars can cause lung damage and make it more difficult to manage your diabetes  Call 911 if you have any of the following:   · You have any of the following signs of a stroke:      ¨ Numbness or drooping on one side of your face     ¨ Weakness in an arm or leg    ¨ Confusion or difficulty speaking    ¨ Dizziness, a severe headache, or vision loss    · You have any of the following signs of a heart attack:      ¨ Squeezing, pressure, or pain in your chest that lasts longer than 5 minutes or returns    ¨ Discomfort or pain in your back, neck, jaw, stomach, or arm     ¨ Trouble breathing    ¨ Nausea or vomiting    ¨ Lightheadedness or a sudden cold sweat, especially with chest pain or trouble breathing  Seek care immediately if:   · You have severe abdominal pain, or the pain spreads to your back  You may also be vomiting  · You have trouble staying awake or focusing  · You are shaking or sweating  · You have blurred or double vision  · Your breath has a fruity, sweet smell  · Your breathing is deep and labored, or rapid and shallow  · Your heartbeat is fast and weak  · You fall and get hurt  Contact your healthcare provider if:   · You are vomiting or have diarrhea  · You have an upset stomach and cannot eat the foods on your meal plan  · You feel weak or more tired than usual      · You feel dizzy, have headaches, or are easily irritated  · Your skin is red, warm, dry, or swollen       · You have a wound that does not heal      · You have numbness in your arms or legs      · You have trouble coping with your illness, or you feel anxious or depressed  · You have problems with your memory  · You have changes in your vision  · You have questions or concerns about your condition or care  Treatment for diabetes  includes keeping your blood sugar at a normal level  You must eat the right foods, and exercise regularly  You may need medicine if you cannot control your blood sugar level with nutrition and exercise  You may also need medicine to lower your blood pressure or cholesterol, or medicine to prevent blood clots  Manage the ABCs and prevent problems caused by diabetes:   · Check your blood sugar levels as directed  Your healthcare provider will tell you when and how often to check during the day  Your healthcare provider will also tell you what your blood sugar levels should be before and after a meal  You may need to check for ketones in your urine or blood if your level is higher than directed  Write down your results and show them to your healthcare provider  Your provider may use the results to make changes to your medicine, food, or exercise schedules  Ask your healthcare provider for more information about how to treat a high or low blood sugar level  · Follow your meal plan as directed  A dietitian will help you make a meal plan to keep your blood sugar level steady and make sure you get enough nutrition  Do not skip meals  Your blood sugar level may drop too low if you have taken diabetes medicine and do not eat  Ask your healthcare provider about programs in your community that can deliver the meals to your home  · Try to be active for 30 to 60 minutes most days of the week  Exercise can help keep your blood sugar level steady, decrease your risk of heart disease, and help you lose weight  It can also help improve your balance and decrease your risk for falls  Work with your healthcare provider to create an exercise plan   Ask a family member or friend to exercise with you  Start slow and exercise for 5 to 10 minutes at a time  Examples of activities include walking or swimming  Include muscle strengthening activities 2 to 3 days each week  Include balance training 2 to 3 times each week  Activities that help increase balance include yoga and aldair chi      · Maintain a healthy weight  Ask your healthcare provider how much you should weigh  A healthy weight can help you control your diabetes and prevent heart disease  Ask your provider to help you create a weight loss plan if you are overweight  Together you can set manageable weight loss goals  · Do not smoke  Ask your healthcare provider for information if you currently smoke and need help to quit  Do not use e-cigarettes or smokeless tobacco in place of cigarettes or to help you quit  They still contain nicotine  · Manage stress  Stress may increase your blood sugar level  Deep breathing, muscle relaxation, and music may help you relax  Ask your healthcare provider for more information about these practices  Other ways to manage your diabetes:   · Check your feet every day for sores  Look at your whole foot, including the bottom, and between and under your toes  Check for wounds, corns, and calluses  Use a mirror to see the bottom of your feet  The skin on your feet may be shiny, tight, dry, or darker than normal  Your feet may also be cold and pale  Feel your feet by running your hands along the tops, bottoms, sides, and between your toes  Redness, swelling, and warmth are signs of blood flow problems that can lead to a foot ulcer  Do not try to remove corns or calluses yourself  · Wear medical alert identification  Wear medical alert jewelry or carry a card that says you have diabetes  Ask your healthcare provider where to get these items  · Ask about vaccines  You have a higher risk for serious illness if you get the flu, pneumonia, or hepatitis   Ask your healthcare provider if you should get a flu, pneumonia, shingles, or hepatitis B vaccine, and when to get the vaccine  · Keep all appointments  You may need to return to have your A1c checked every 3 months  You will need to return at least once each year to have your feet checked  You will need an eye exam once a year to check for retinopathy  You will also need urine tests every year to check for kidney problems  You may need tests to monitor for heart disease  Write down your questions so you remember to ask them during your visits  · Get help from family and friends  You may need help checking your blood sugar level, giving insulin injections, or preparing your meals  Ask your family and friends to help you with these tasks  Talk to your healthcare provider if you do not have someone at home to help you  A healthcare provider can come to your home to help you with these tasks  Follow up with your healthcare provider as directed: You may need to return to have your A1c checked every 3 months  You will need to return at least once each year to have your feet checked  You will need an eye exam once a year to check for retinopathy  You will also need urine tests every year to check for kidney problems  You may need tests to monitor for heart disease  Write down your questions so you remember to ask them during your visits  © 2017 [employer] Information is for End User's use only and may not be sold, redistributed or otherwise used for commercial purposes  All illustrations and images included in CareNotes® are the copyrighted property of A D [employer] M [employer]  or Jaspreet Encarnacion  The above information is an  only  It is not intended as medical advice for individual conditions or treatments  Talk to your doctor, nurse or pharmacist before following any medical regimen to see if it is safe and effective for you      Diabetic Foot Ulcers   WHAT YOU NEED TO KNOW:   A diabetic foot ulcer is an open sore that can develop anywhere on your foot or toes  The ulcers usually develop on the bottom of the foot  You may first notice drainage on your sock  Drainage is fluid that may be yellow, brown, or red  The fluid may also contain pus or blood  DISCHARGE INSTRUCTIONS:   Call 911 if:   · You have a fever with chills  · You begin vomiting  · You feel faint or become confused  Contact your healthcare provider if:   · You get another diabetic foot ulcer  · Your foot becomes red, warm, and swollen  · Your foot ulcer has a bad smell or is draining pus  · You feel pain in a foot that used to have little or no feeling  · You see black or dead tissue in or around your ulcer  · Your ulcer becomes bigger, deeper, or does not heal      · You have questions or concerns about your condition or care  Medicines: You may  need any of the following:  · Antibiotics  help treat or prevent a bacterial infection  · Take your medicine as directed  Contact your healthcare provider if you think your medicine is not helping or if you have side effects  Tell him or her if you are allergic to any medicine  Keep a list of the medicines, vitamins, and herbs you take  Include the amounts, and when and why you take them  Bring the list or the pill bottles to follow-up visits  Carry your medicine list with you in case of an emergency  Care for your wound as directed:  A bandages will be put on your ulcer  Your healthcare provider will give you instructions on changing your bandage  You may need to clean the wound and change the bandage daily  The bandage may contain medicines to help your ulcer heal  You may be asked to put medicine on your foot ulcer before putting on the bandage  The medicine may also prevent growth of tissue that is not healthy  You may need to cover your wound with a plastic bag while you bathe   Ask your healthcare provider for instructions on bathing until your foot heals  Prevent diabetic foot ulcers:  Good foot care may help prevent ulcers, or keep them from getting worse  Ask someone to help you if you are not able to check your feet by yourself  You or another person may need to do any of the following:  · Keep your blood sugar levels under control  Continue the plan for your diabetes that you and your healthcare provider have discussed  Healthy food choices and taking your medicines as directed may help control blood sugars  Contact your healthcare provider if your blood sugar levels are higher than directed  · Wash your feet each day with soap and warm water  Do not use hot water, because this can injure your foot  Dry your feet gently with a towel after you wash them  Dry between and under your toes  Put lotion or moisturizer on your feet  Do not  put lotion or moisturizer between your toes  · Check your feet each day  Look at your whole foot, including the bottom, and between and under your toes  Check for wounds, corns, and calluses  Feel your feet by running your hands along the tops, bottoms, sides, and between your toes  Use a nonbreakable mirror to check your feet if you have trouble seeing the bottoms  Do not try to remove corns or calluses yourself  File or cut your toenails straight across  · Protect your feet  Do not walk barefoot or wear your shoes without socks  Check your shoes for rocks or other objects that can hurt your feet  Wear cotton socks to help keep your feet dry  Wear socks without toe seams, or wear them with the seams inside out  Change your socks each day  Do not wear socks that are dirty or damp  · Wear shoes that fit well  Wear shoes that do not rub against any area of your feet  Your shoes should be ½ to ¾ inch (1 to 2 centimeters) longer than your feet  Your shoes should also have extra space around the widest part of your feet   Walking or athletic shoes with laces or straps that adjust are best  Ask your healthcare provider for help to choose shoes that fit you best  Ask him if you need to wear an insert, orthotic, or bandage on your feet  · Do not smoke  Nicotine can cause damage to your blood vessels and increases your risk for foot ulcers  Do not use e-cigarettes or smokeless tobacco in place of cigarettes or to help you quit  They still contain nicotine  Ask your healthcare provider for information if you currently smoke and need help quitting  · Do not drink alcohol  Alcohol increases your blood sugar levels and make your diabetes more difficult to manage  Ask your healthcare provider for information if you need help to quit drinking alcohol  · Maintain a healthy weight  Ask your healthcare provider how much you should weigh  A healthy weight can help you control your diabetes  Ask him to help you create a weight loss plan if you are overweight  Even a 10 to 15 pound weight loss can help you manage your blood sugar level  Follow up with your healthcare provider or foot specialist as directed: You may need to return often to have your wound checked  Your wound may be measured to see if it is getting smaller  Bring any offloading devices or footwear to your follow-up visits so your healthcare provider or specialist can check them  Write down your questions so you remember to ask them during your visits  © 2017 [facility] [facility] Information is for End User's use only and may not be sold, redistributed or otherwise used for commercial purposes  All illustrations and images included in CareNotes® are the copyrighted property of [employer]  or Jaspreet Encarnacion  The above information is an  only  It is not intended as medical advice for individual conditions or treatments  Talk to your doctor, nurse or pharmacist before following any medical regimen to see if it is safe and effective for you      Diabetic Gastroparesis   WHAT YOU NEED TO KNOW:   What is diabetic gastroparesis? Diabetic gastroparesis is a type of nerve damage that slows digestion  High blood sugar levels from diabetes can damage nerves and tissues in your stomach  The damage prevents your stomach from emptying normally  Gastroparesis is also called delayed gastric emptying  What increases my risk for diabetic gastroparesis? · History of type 1 or type 2 diabetes for at least 10 years    · Eye, nerve, or kidney problems due to diabetes  What are the signs and symptoms of diabetic gastroparesis? Your symptoms may be worse if you drink alcohol or smoke  You may have any of the following:  · Constipation that may be replaced, at times, by diarrhea    · High or low blood sugar levels that you cannot control    · Nausea, vomiting, or loss of appetite    · Bloated or early full feeling while you eat    · Sudden cramps, swelling, or pain in your abdomen    · Weight loss without trying     · Heartburn  How is diabetic gastroparesis diagnosed? Your healthcare provider will feel your abdomen and ask about your diabetes  You may need any of the following tests:  · A gastric emptying breath test (GEBT)  will show how fast food moves from your stomach to your small intestine  The test measures the amount of carbon in your breath after you eat a meal prepared by healthcare providers  · An x-ray or ultrasound  may show how your stomach is working  You may be given a chalky liquid to drink before the pictures are taken  This liquid helps your stomach and intestines show up clearly on the monitor  · An endoscopy  may show what is causing your digestive problems  A scope is used to show images of the inside of your stomach  A scope is a thin, bendable tube with a light and camera on the end  Samples may be taken from your digestive tract and sent to a lab for tests  · A scintigraphy , or gastric-emptying scan, measures how quickly food moves out of your stomach   A slightly radioactive substance is placed in food  The amount of radiation is small and safe  You eat the food and then lie under a machine that takes pictures of the food inside your stomach  Pictures will be taken every 15 minutes, up to 4 hours after you eat, or as directed  How is diabetic gastroparesis treated? · Medicines:      ¨ Motility medicines  help your stomach muscles move food and liquids out of your stomach faster  These medicines also may help you digest food better  ¨ Nausea medicine  helps calm your stomach and prevent vomiting  · A feeding tube  may be needed if your stomach cannot process food  You may need the feeding tube for a short time, until your stomach starts working properly  You may instead need a long-term feeding tube if your gastroparesis is severe  Ask for more information about feeding tubes  How can I manage my symptoms? · Walk after you eat  This may help speed digestion  · Follow the meal plan  that your healthcare or dietitian gave you  This meal plan can help decrease your symptoms  The following may also help you manage your symptoms:     ¨ Eat less fat and fiber  High-fat and high-fiber foods may be hard for your stomach to digest  You may need to avoid fruits and vegetables such as oranges and broccoli  ¨ Eat 4 to 6 small meals a day  Smaller, more frequent meals are easier for your stomach to handle  ¨ Drink more liquids with your meals  Your healthcare provider may recommend liquid meals, such as soup  Liquid is easier to digest than solid food  ¨ Ask if you should prepare your food in a   Blended foods are easier to digest  Ask for directions on which foods to use and how to blend the food correctly  · Do not smoke  Nicotine can damage blood vessels, slow your digestion, and make it more difficult to manage your diabetes  Do not use e-cigarettes or smokeless tobacco in place of cigarettes or to help you quit  They still contain nicotine   Ask your healthcare provider for information if you currently smoke and need help quitting  · Do not drink alcohol  Alcohol may slow your digestion more  · Follow your diabetes treatment plan  You may need to check your blood sugar more often  High blood sugar levels slow digestion and can make your symptoms worse  When should I seek immediate care? · You are vomiting more severely or for a longer period than usual      · You urinate less than usual, and your mouth is dry  · You feel dizzy and weak, or you have fainted  · You have severe pain in your stomach or abdomen  When should I contact my healthcare provider? · Your blood sugar level is higher or lower than healthcare providers have told you it should be  · You continue to have pain and bloating in your abdomen  · You continue to have nausea and vomiting, or you are not able to eat  · You have questions or concerns about your condition or care  CARE AGREEMENT:   You have the right to help plan your care  Learn about your health condition and how it may be treated  Discuss treatment options with your caregivers to decide what care you want to receive  You always have the right to refuse treatment  The above information is an  only  It is not intended as medical advice for individual conditions or treatments  Talk to your doctor, nurse or pharmacist before following any medical regimen to see if it is safe and effective for you  © 2017 [employer] Information is for End User's use only and may not be sold, redistributed or otherwise used for commercial purposes  All illustrations and images included in CareNotes® are the copyrighted property of A D A M [employer] Inc  or Jaspreet Encarnacion

## 2019-11-28 NOTE — DISCHARGE INSTR - AVS FIRST PAGE
Thank you for choosing [facility] for year care, please take all prescriptions as instructed, please make appropriate follow-up visits

## 2019-11-28 NOTE — ASSESSMENT & PLAN NOTE
· Newly diagnosed diabetes mellitus this admission with hgbA1c 11 9%, likely type 2  · Increase Lantus to 25 units QHS, scheduled humalog to 8 units TID and increase SSI algorithm coverage  · Blood sugars is significantly improved  · Consider endocrinology consultation if no improvement noted on increased insulin coverage  · Will need endocrine follow up outpatient and insulin supplies on discharge  · Monitor QID glucose checks  · Continue consistent carb diet, nutrition education  · Pt also reports neuropathic pain, will trial gabapentin 100 mg BID and titrate as needed  · Extensive education provided to patient from [address] [place] [address] and nursing staff patient verbalizes understanding and has mild confidence and his ability to manage his diabetes on his own    He has been educated about my chart, he understands that he has follow-up appointments with PCP and endocrine  · Likely etiology for recurrent scrotal infections

## 2019-11-28 NOTE — PLAN OF CARE
Problem: INFECTION - ADULT  Goal: Absence or prevention of progression during hospitalization  Description  INTERVENTIONS:  - Assess and monitor for signs and symptoms of infection  - Monitor lab/diagnostic results  - Monitor all insertion sites, i e  indwelling lines, tubes, and drains  - Monitor endotracheal if appropriate and nasal secretions for changes in amount and color  - [place] appropriate cooling/warming therapies per order  - Administer medications as ordered  - Instruct and encourage patient and family to use good hand hygiene technique  - Identify and instruct in appropriate isolation precautions for identified infection/condition  Outcome: Progressing  Goal: Absence of fever/infection during neutropenic period  Description  INTERVENTIONS:  - Monitor WBC    Outcome: Progressing     Problem: SAFETY ADULT  Goal: Patient will remain free of falls  Description  INTERVENTIONS:  - Assess patient frequently for physical needs  -  Identify cognitive and physical deficits and behaviors that affect risk of falls    -  [place] fall precautions as indicated by assessment   - Educate patient/family on patient safety including physical limitations  - Instruct patient to call for assistance with activity based on assessment  - Modify environment to reduce risk of injury  - Consider OT/PT consult to assist with strengthening/mobility  Outcome: Progressing  Goal: Maintain or return to baseline ADL function  Description  INTERVENTIONS:  -  Assess patient's ability to carry out ADLs; assess patient's baseline for ADL function and identify physical deficits which impact ability to perform ADLs (bathing, care of mouth/teeth, toileting, grooming, dressing, etc )  - Assess/evaluate cause of self-care deficits   - Assess range of motion  - Assess patient's mobility; develop plan if impaired  - Assess patient's need for assistive devices and provide as appropriate  - Encourage maximum independence but intervene and supervise when necessary  - Involve family in performance of ADLs  - Assess for home care needs following discharge   - Consider OT consult to assist with ADL evaluation and planning for discharge  - Provide patient education as appropriate  Outcome: Progressing  Goal: Maintain or return mobility status to optimal level  Description  INTERVENTIONS:  - Assess patient's baseline mobility status (ambulation, transfers, stairs, etc )    - Identify cognitive and physical deficits and behaviors that affect mobility  - Identify mobility aids required to assist with transfers and/or ambulation (gait belt, sit-to-stand, lift, walker, cane, etc )  - [place] fall precautions as indicated by assessment  - Record patient progress and toleration of activity level on Mobility SBAR; progress patient to next Phase/Stage  - Instruct patient to call for assistance with activity based on assessment  - Consider rehabilitation consult to assist with strengthening/weightbearing, etc   Outcome: Progressing     Problem: DISCHARGE PLANNING  Goal: Discharge to home or other facility with appropriate resources  Description  INTERVENTIONS:  - Identify barriers to discharge w/patient and caregiver  - Arrange for needed discharge resources and transportation as appropriate  - Identify discharge learning needs (meds, wound care, etc )  - Arrange for interpretive services to assist at discharge as needed  - Refer to Case Management Department for coordinating discharge planning if the patient needs post-hospital services based on physician/advanced practitioner order or complex needs related to functional status, cognitive ability, or social support system  Outcome: Progressing     Problem: GASTROINTESTINAL - ADULT  Goal: Minimal or absence of nausea and/or vomiting  Description  INTERVENTIONS:  - Administer IV fluids if ordered to ensure adequate hydration  - Maintain NPO status until nausea and vomiting are resolved  - Nasogastric tube if ordered  - Administer ordered antiemetic medications as needed  - Provide nonpharmacologic comfort measures as appropriate  - Advance diet as tolerated, if ordered  - Consider nutrition services referral to assist patient with adequate nutrition and appropriate food choices  Outcome: Progressing  Goal: Maintains or returns to baseline bowel function  Description  INTERVENTIONS:  - Assess bowel function  - Encourage oral fluids to ensure adequate hydration  - Administer IV fluids if ordered to ensure adequate hydration  - Administer ordered medications as needed  - Encourage mobilization and activity  - Consider nutritional services referral to assist patient with adequate nutrition and appropriate food choices  Outcome: Progressing  Goal: Maintains adequate nutritional intake  Description  INTERVENTIONS:  - Monitor percentage of each meal consumed  - Identify factors contributing to decreased intake, treat as appropriate  - Assist with meals as needed  - Monitor I&O, weight, and lab values if indicated  - Obtain nutrition services referral as needed  Outcome: Progressing  Goal: Establish and maintain optimal ostomy function  Description  INTERVENTIONS:  - Assess bowel function  - Encourage oral fluids to ensure adequate hydration  - Administer IV fluids if ordered to ensure adequate hydration   - Administer ordered medications as needed  - Encourage mobilization and activity  - Nutrition services referral to assist patient with appropriate food choices  - Assess stoma site  - Consider wound care consult   Outcome: Progressing     Problem: Knowledge Deficit  Goal: Patient/family/caregiver demonstrates understanding of disease process, treatment plan, medications, and discharge instructions  Description  Complete learning assessment and assess knowledge base    Interventions:  - Provide teaching at level of understanding  - Provide teaching via preferred learning methods  Outcome: Progressing     Problem: METABOLIC, FLUID AND ELECTROLYTES - ADULT  Goal: Electrolytes maintained within normal limits  Description  INTERVENTIONS:  - Monitor labs and assess patient for signs and symptoms of electrolyte imbalances  - Administer electrolyte replacement as ordered  - Monitor response to electrolyte replacements, including repeat lab results as appropriate  - Instruct patient on fluid and nutrition as appropriate  Outcome: Progressing  Goal: Fluid balance maintained  Description  INTERVENTIONS:  - Monitor labs   - Monitor I/O and WT  - Instruct patient on fluid and nutrition as appropriate  - Assess for signs & symptoms of volume excess or deficit  Outcome: Progressing  Goal: Glucose maintained within target range  Description  INTERVENTIONS:  - Monitor Blood Glucose as ordered  - Assess for signs and symptoms of hyperglycemia and hypoglycemia  - Administer ordered medications to maintain glucose within target range  - Assess nutritional intake and initiate nutrition service referral as needed  Outcome: Progressing     Problem: GENITOURINARY - ADULT  Goal: Maintains or returns to baseline urinary function  Description  INTERVENTIONS:  - Assess urinary function  - Encourage oral fluids to ensure adequate hydration if ordered  - Administer IV fluids as ordered to ensure adequate hydration  - Administer ordered medications as needed  - Offer frequent toileting  - Follow urinary retention protocol if ordered  Outcome: Progressing     Problem: SKIN/TISSUE INTEGRITY - ADULT  Goal: Skin integrity remains intact  Description  INTERVENTIONS  - Identify patients at risk for skin breakdown  - Assess and monitor skin integrity  - Assess and monitor nutrition and hydration status  - Monitor labs (i e  albumin)  - Assess for incontinence   - Turn and reposition patient  - Assist with mobility/ambulation  - Relieve pressure over bony prominences  - Avoid friction and shearing  - Provide appropriate hygiene as needed including keeping skin clean and dry  - Evaluate need for skin moisturizer/barrier cream  - Collaborate with interdisciplinary team (i e  Nutrition, Rehabilitation, etc )   - Patient/family teaching  Outcome: Progressing     Problem: Potential for Falls  Goal: Patient will remain free of falls  Description  INTERVENTIONS:  - Assess patient frequently for physical needs  -  Identify cognitive and physical deficits and behaviors that affect risk of falls  -  [place] fall precautions as indicated by assessment   - Educate patient/family on patient safety including physical limitations  - Instruct patient to call for assistance with activity based on assessment  - Modify environment to reduce risk of injury  - Consider OT/PT consult to assist with strengthening/mobility  Outcome: Progressing     Problem: Nutrition/Hydration-ADULT  Goal: Nutrient/Hydration intake appropriate for improving, restoring or maintaining nutritional needs  Description  Monitor and assess patient's nutrition/hydration status for malnutrition  Collaborate with interdisciplinary team and initiate plan and interventions as ordered  Monitor patient's weight and dietary intake as ordered or per policy  Utilize nutrition screening tool and intervene as necessary  Determine patient's food preferences and provide high-protein, high-caloric foods as appropriate       INTERVENTIONS:  - Monitor oral intake, urinary output, labs, and treatment plans  - Assess nutrition and hydration status and recommend course of action  - Evaluate amount of meals eaten  - Assist patient with eating if necessary   - Allow adequate time for meals  - Recommend/ encourage appropriate diets, oral nutritional supplements, and vitamin/mineral supplements  - Order, calculate, and assess calorie counts as needed  - Recommend, monitor, and adjust tube feedings and TPN/PPN based on assessed needs  - Assess need for intravenous fluids  - Provide specific nutrition/hydration education as appropriate  - Include patient/family/caregiver in decisions related to nutrition  Outcome: Progressing

## 2019-11-28 NOTE — RESPIRATORY THERAPY NOTE
Pt placed on BIPAP 17/14, rate 10, for the night  Comfortable fit nasal mask  Will continue to monitor

## 2019-12-06 ENCOUNTER — HOSPITAL ENCOUNTER (OUTPATIENT)
Dept: SLEEP CENTER | Facility: CLINIC | Age: 49
Discharge: HOME | End: 2019-12-06
Payer: COMMERCIAL

## 2019-12-06 ENCOUNTER — TRANSCRIBE ORDERS (OUTPATIENT)
Dept: SLEEP CENTER | Facility: CLINIC | Age: 49
End: 2019-12-06

## 2019-12-06 DIAGNOSIS — G47.33 OSA (OBSTRUCTIVE SLEEP APNEA): ICD-10-CM

## 2019-12-06 PROCEDURE — 95811 POLYSOM 6/>YRS CPAP 4/> PARM: CPT

## 2019-12-06 PROCEDURE — 95811 POLYSOM 6/>YRS CPAP 4/> PARM: CPT | Performed by: PSYCHIATRY & NEUROLOGY

## 2019-12-07 NOTE — PROGRESS NOTES
Sleep Study Documentation    Pre-Sleep Study       Sleep testing procedure explained to patient:YES    Patient napped prior to study:YES- less than 30 minutes  Napped after 2PM: yes    Caffeine:Dayshift worker after 12PM   Caffeine use:NO    Alcohol:Dayshift workers after 5PM: Alcohol use:NO    Typical day for patient:YES       Study Documentation    Sleep Study Indications: severe sepsis, diabetes, AYLA, snoring    Sleep Study: Split Optimal PAP pressure: 11/6CM  Leak:Small  Snore:Eliminated  REM Obtained:yes  Supplemental O2: no    Minimum SaO2 83%  Baseline SaO2 96%  PAP mask tried (list all)SMALL RESMED MIRAGE ACTIVA  PAP mask choice (final)SMALL RESMED MIRAGE ACTIVA  PAP mask type:nasal  PAP pressure at which snoring was eliminated 9/5CM  Minimum SaO2 at final PAP pressure 94%  Mode of Therapy:BiPAP  ETCO2:No  CPAP changed to BiPAP:No    Mode of Therapy:BiPAP    EKG abnormalities: no     EEG abnormalities: no    Sleep Study Recorded < 2 hours: N/A    Sleep Study Recorded > 2 hours but incomplete study: N/A    Sleep Study Recorded 6 hours but no sleep obtained: NO    Patient classification: employed       Post-Sleep Study    Medication used at bedtime or during sleep study:YES other prescription medications    Patient reports time it took to fall asleep:30 to 60 minutes    Patient reports waking up during study:3 or more times  Patient reports returning to sleep in 10 to 30 minutes  Patient reports sleeping 4 to 6 hours without dreaming  Patient reports sleep during study:worse than usual    Patient rated sleepiness: Not sleepy or tired    PAP treatment:yes: Post PAP treatment patient reports feeling unchanged and would wear PAP mask at home

## 2019-12-11 ENCOUNTER — TELEPHONE (OUTPATIENT)
Dept: SLEEP CENTER | Facility: CLINIC | Age: 49
End: 2019-12-11

## 2019-12-11 DIAGNOSIS — G47.33 OSA (OBSTRUCTIVE SLEEP APNEA): Primary | ICD-10-CM

## 2019-12-11 NOTE — TELEPHONE ENCOUNTER
----- Message from Chivo Mcmahon MD sent at 12/11/2019  1:36 PM EST -----  Split study read  Pressure changed  Irregular heart rhythm at times, should be seen by PCP/cardiology  Follow up in 2 months

## 2019-12-11 NOTE — TELEPHONE ENCOUNTER
Advised patient sleep study results  Patient agreeable to pressure change  Pressure change information emailed to Antonieta Schaumann       Patient has appointment with his PCP he will follow up on heart irregular heart rythm

## 2019-12-12 ENCOUNTER — TELEPHONE (OUTPATIENT)
Dept: SLEEP CENTER | Facility: CLINIC | Age: 49
End: 2019-12-12

## 2019-12-12 DIAGNOSIS — G47.33 OSA (OBSTRUCTIVE SLEEP APNEA): Primary | ICD-10-CM

## 2019-12-12 NOTE — TELEPHONE ENCOUNTER
Per [employer] liaison, patient is going to use them for resupplies, but he received his CPAP machine elsewhere  [facility] (mentioned in office note)- they state they did not supply machine  Called [facility] (also mentioned in office note) and they don't have record of him in their system  Called patient & left message to see who supplied his CPAP machine so that pressure change order can be faxed to them  Per Hever Herrera, she can do pressure change manually if patient wishes to bring machine into office

## 2019-12-12 NOTE — UTILIZATION REVIEW
Notification of Discharge  This is a Notification of Discharge from our facility [address]  Please be advised that this patient has been discharge from our facility  Below you will find the admission and discharge date and time including the patients disposition  PRESENTATION DATE: 11/23/2019 12:33 PM  OBS ADMISSION DATE:   IP ADMISSION DATE: 11/23/19 1559   DISCHARGE DATE: 11/28/2019 12:28 PM  DISPOSITION: Home/Self Care Home/Self Care   Admission Orders listed below:  Admission Orders (From admission, onward)     Ordered        11/23/19 1559  Inpatient Admission  Once                   Please contact the UR Department if additional information is required to close this patient's authorization/case  [facility] Utilization Review Department  Main: [phone] x carefully listen to the prompts  All voicemails are confidential   [email or web address]  Send all requests for admission clinical reviews, approved or denied determinations and any other requests to dedicated fax number below belonging to the campus where the patient is receiving treatment   List of dedicated fax numbers:  [address] DENIALS (Administrative/Medical Necessity) [phone]   [address] (Maternity/NICU/Pediatrics) [phone]   [facility] [phone]   Ruddy Sole [phone]   Ladarius Miranda [phone]   [facility] [phone]   Corbin Cabot [phone]   Tomas Patel [phone]   [facility] [address] [phone]

## 2019-12-12 NOTE — TELEPHONE ENCOUNTER
Patient returned call- he spoke with insurance & [employer] & was told he is eligible for a new machine  He would also like [facility] to manually change pressure on current machine  Will schedule him for appropriate appointment after talking with Surinder Marshall    Please write new DME order

## 2019-12-17 ENCOUNTER — TELEPHONE (OUTPATIENT)
Dept: SLEEP CENTER | Facility: CLINIC | Age: 49
End: 2019-12-17

## 2019-12-17 NOTE — TELEPHONE ENCOUNTER
Tried to return Alysha's call from [facility]   Unable to reach her no one answers

## 2020-01-02 ENCOUNTER — TELEPHONE (OUTPATIENT)
Dept: SLEEP CENTER | Facility: CLINIC | Age: 50
End: 2020-01-02

## 2020-01-10 ENCOUNTER — TELEPHONE (OUTPATIENT)
Dept: PSYCHIATRY | Facility: CLINIC | Age: 50
End: 2020-01-10

## 2020-01-10 NOTE — TELEPHONE ENCOUNTER
Juaquin Hector from [facility] [employer] called regarding the sleep study on [place]  The doctor is looking for a recording on the strips of the EKG taken at the sleep study  Please call Dr Pelon Blackwell from Cardiology with [facility] [phone] ext [phone]  The fax number is [phone]

## 2020-01-14 NOTE — TELEPHONE ENCOUNTER
I will have to see if I can retrieve them from the sleep study as it has been removed from the area in which I review it  I will inquire and get back to him   thanks

## 2020-02-05 NOTE — TELEPHONE ENCOUNTER
I put a piece of the study into the report, but it does not translate and is unable to be seen  Salazar Brandt looked over the EKG again and it looks like a sinus arrhythmia  Caren called [facility] and tried to leave a message but this was not possible  I will have the sleep clinic try and contact him  Jaspreet Cronin, please try and contact Dr Ezequiel Braswell at Cardiology with [facility]  [phone]  The patient above had some sinus arrhythmia on his overnight study and thought he should be evaluated for his

## 2020-02-11 NOTE — TELEPHONE ENCOUNTER
I spoke with Gifty Quiros earlier today, advised that I will check with the sleep tech to see if would be able to obtain EKG  Advised will fax if able to obtain  Jennifer or Ryan were unable to obtain EKG  Attempted to contact Doretha, not able to leave message    Note faxed to above fax #

## 2020-02-24 ENCOUNTER — OFFICE VISIT (OUTPATIENT)
Dept: SLEEP CENTER | Facility: CLINIC | Age: 50
End: 2020-02-24
Payer: COMMERCIAL

## 2020-02-24 VITALS
SYSTOLIC BLOOD PRESSURE: 134 MMHG | DIASTOLIC BLOOD PRESSURE: 80 MMHG | BODY MASS INDEX: 31.68 KG/M2 | HEIGHT: 73 IN | HEART RATE: 70 BPM | WEIGHT: 239 LBS

## 2020-02-24 DIAGNOSIS — G47.33 OSA (OBSTRUCTIVE SLEEP APNEA): Primary | ICD-10-CM

## 2020-02-24 PROCEDURE — 99214 OFFICE O/P EST MOD 30 MIN: CPT | Performed by: PSYCHIATRY & NEUROLOGY

## 2020-02-24 RX ORDER — ROSUVASTATIN CALCIUM 10 MG/1
10 TABLET, COATED ORAL DAILY
COMMUNITY

## 2020-02-24 RX ORDER — METFORMIN HYDROCHLORIDE 750 MG/1
TABLET, EXTENDED RELEASE ORAL
COMMUNITY
Start: 2020-02-23

## 2020-02-24 NOTE — PATIENT INSTRUCTIONS
Recommendations:    1) BiPAP at 12/7cm  2) Safe driving reviewed  3) Follow-up 3 months  4) Call with any questions or concerns

## 2020-02-24 NOTE — PROGRESS NOTES
Sleep Medicine Follow-Up Note    HPI: 53yo M with AYLA presenting for a compliance visit  Treatment Summary: The patient stated that he was diagnosed in  in [place]  He was started on CPAP and eventually got onto BiPAP  He has been on BiPAP 17/14cm most of the time  BiPAP study in 2019: 11/6cm recommended  HPI:   Today, patient presents unaccompanied  He stated that things have been going well  He no longer has aerophagia, like he has with his old pressure  He denied any pressure intolerance or air hunger  He does have some days when he still gets tired after lunch and can get sleepy  He has started to take mens supplements from [facility] at lunch time  Treatment: BiPAP  -Pressure: 11/6cm   -Pressure intolerance: no   -Aerophagia: no   -Air Hunger: no  -Interface: NP  -Fit: okay  -Chin strap: no  -HCC: YME  -Patient's perceived outcome: no longer feels aerophagia  He feels that it is working well for him and he cannot sleep without it       Compliance Card Data:    - Date Range: 20-20   - Settin/6cm   - Residual AHI: 4 2   - %  Night in Large Leak: 4 sec   - Average usage days used: 6h 38m   - % Days used: 93%   - % Days with Usage > 4 hrs: 93%    Respiratory:  -Ongoing Snoring: no  -Mouth Breathing: no  -Dry Mouth: no  -Nocturnal Gasping: no    ROS:   Genitourinary none   Cardiology none   Gastrointestinal none   Neurology none   Constitutional none   Integumentary none   Psychiatry none   Musculoskeletal none   Pulmonary none   ENT none   Endocrine none   Hematological none       Sleep Pattern:  -Position: back  -Bedtime: 9pm  -Lights Out: same time  -Environment: no Lights/TV  -Latency: 5 mins  -Awakenings: once to void  -Wake Time: 5am  -Rise Time: same time  -Patient's estimate of Sleep Time: 6-8h    Daytime Symptoms:  -Upon Awakening: good, rested  -Daytime fatigue/sleepiness: tired after lunch  -Naps: if he gets an opportunity, he will nap  -Involuntary Dozing: denied  -Cognitive Symptoms: denied  -Driving: Difficulty with sleepiness and driving:  Yes, but doesn't fall asleep  He will pull over and nap  -- Close calls related to sleepiness: denied   -- Accidents related to sleepiness: denied    Substance Use:  -Caffeine: 2-3 cups of coffee a day  -Alcohol: occasionally  -THC: denied    --> diagnosed with diabetes since last visit  --> Supplemental Oxygen Use: denies    Questionnaire:  Sitting and reading: Slight chance of dozing  Watching TV: Moderate chance of dozing  Sitting, inactive in a public place (e g  a theatre or a meeting): Slight chance of dozing  As a passenger in a car for an hour without a break: Moderate chance of dozing  Lying down to rest in the afternoon when circumstances permit: High chance of dozing  Sitting and talking to someone: Would never doze  Sitting quietly after a lunch without alcohol: Would never doze  In a car, while stopped for a few minutes in traffic: Would never doze  Total score: 9      PE:    /80   Pulse 70   Ht 6' 1" (1 854 m)   Wt 108 kg (239 lb)   BMI 31 53 kg/m²     General:  In NAD  Pul: Respirations: unlaboured  MS: No atrophy  Neuro: No resting tremor  Gait normal turning & station; unremarkable overall  Psych: Socially appropriate  Pleasant  No overt dysphoria  Assessment: The patient has been compliant with his BiPAP and his obstructive events are well controlled with a residual AHI 4 2  He is deriving benefit from using his BiPAP as he is less tired than he was in the past and no longer has sleepiness while driving  Will slightly increase his pressure today for some residual daytime tiredness  This may be related to his diabetes  He also still has sleepiness while driving, so he pulls over when it hits  Recommendations:    1) BiPAP at 12/7cm  2) Safe driving reviewed  3) Follow-up 3 months  4) Call with any questions or concerns        All questions answered for the patient, who indicated understanding and agreed with the plan       Norman Zheng MD  Psychiatry/ Sleep medicine

## 2020-02-25 ENCOUNTER — TELEPHONE (OUTPATIENT)
Dept: SLEEP CENTER | Facility: CLINIC | Age: 50
End: 2020-02-25

## 2020-02-25 NOTE — TELEPHONE ENCOUNTER
Received a pressure change  Changed accordingly  Patient's now set to a Bipap 12/7cm  Called patient and informed him

## 2020-05-28 ENCOUNTER — TELEMEDICINE (OUTPATIENT)
Dept: SLEEP CENTER | Facility: CLINIC | Age: 50
End: 2020-05-28
Payer: COMMERCIAL

## 2020-05-28 DIAGNOSIS — G47.33 OSA (OBSTRUCTIVE SLEEP APNEA): Primary | ICD-10-CM

## 2020-05-28 PROCEDURE — 99213 OFFICE O/P EST LOW 20 MIN: CPT | Performed by: PSYCHIATRY & NEUROLOGY

## 2020-12-28 ENCOUNTER — TELEPHONE (OUTPATIENT)
Dept: SLEEP CENTER | Facility: CLINIC | Age: 50
End: 2020-12-28

## 2020-12-28 NOTE — TELEPHONE ENCOUNTER
12/28 Patient called and said he had some questions for Dr Ender Sims about [facility] and if he is a good candidate  Would like someone to please give him a call back [phone] -[phone]

## 2020-12-31 DIAGNOSIS — G47.33 OSA (OBSTRUCTIVE SLEEP APNEA): Primary | ICD-10-CM

## 2021-04-14 ENCOUNTER — TELEPHONE (OUTPATIENT)
Dept: SLEEP CENTER | Facility: CLINIC | Age: 51
End: 2021-04-14

## 2021-04-15 DIAGNOSIS — G47.33 OSA (OBSTRUCTIVE SLEEP APNEA): Primary | ICD-10-CM

## 2022-05-02 ENCOUNTER — CONSULT (OUTPATIENT)
Dept: PULMONOLOGY | Facility: CLINIC | Age: 52
End: 2022-05-02
Payer: COMMERCIAL

## 2022-05-02 VITALS
BODY MASS INDEX: 31.01 KG/M2 | HEIGHT: 73 IN | WEIGHT: 234 LBS | SYSTOLIC BLOOD PRESSURE: 114 MMHG | DIASTOLIC BLOOD PRESSURE: 80 MMHG | HEART RATE: 84 BPM | OXYGEN SATURATION: 96 % | TEMPERATURE: 97.6 F

## 2022-05-02 DIAGNOSIS — G47.33 OSA TREATED WITH BIPAP: ICD-10-CM

## 2022-05-02 DIAGNOSIS — G47.19 EXCESSIVE DAYTIME SLEEPINESS: Primary | ICD-10-CM

## 2022-05-02 DIAGNOSIS — E66.9 OBESITY (BMI 30-39.9): ICD-10-CM

## 2022-05-02 PROCEDURE — 99204 OFFICE O/P NEW MOD 45 MIN: CPT | Performed by: INTERNAL MEDICINE

## 2022-05-02 RX ORDER — BACILLUS COAGULANS/INULIN 1B-250 MG
CAPSULE ORAL
COMMUNITY

## 2022-05-02 RX ORDER — BLOOD-GLUCOSE TRANSMITTER
EACH MISCELLANEOUS
COMMUNITY
Start: 2021-11-05

## 2022-05-02 NOTE — PROGRESS NOTES
Assessment/Plan:     Diagnoses and all orders for this visit:    Excessive daytime sleepiness    AYLA treated with BiPAP    Obesity (BMI 30-39  9)    Other orders  -     Multiple Vitamins-Minerals (MULTIVITAMIN ADULT EXTRA C PO); Take 1 tablet by mouth daily  -     Magnesium 100 MG CAPS; Take 500 mcg by mouth  -     Bacillus Coagulans-Inulin (Probiotic) 1-250 BILLION-MG CAPS; Take by mouth  -     Continuous Blood Gluc Transmit (Guardian Connect Transmitter) MISC; To change site every 7 days  -     Glucose Blood (BL TEST STRIP PACK VI); 2 (two) times a day          Plan for follow up:   Patient with obstructive sleep apnea being treated with BiPAP currently on settings of 12/7 consistently using it  Reviewed BiPAP download compliance for the past 90 days with 100% compliance, greater than 4 hours also 100%, no evidence of any mask leak acceptable residual AHI of 2 2   Given still with excessive daytime sleepiness discussed regarding CPAP titration study followed by multiple sleep latency testing wants to think about it  Cautioned against driving when sleepy  Also discussed regarding medications modafinil given residual sleepiness after adequately treating the sleep apnea understands and verbalizes   Side effects of the medication discussed understands and verbalizes   He states he would like to give us a call in 2-3 weeks and for the options  Follow-up in 6 months or p r n  earlier as needed  No follow-ups on file  All questions are answered to the patient's satisfaction and understanding  He verbalizes understanding  He is encouraged to call with any further questions or concerns  Portions of the record may have been created with voice recognition software  Occasional wrong word or "sound a like" substitutions may have occurred due to the inherent limitations of voice recognition software  Read the chart carefully and recognize, using context, where substitutions have occurred  a    Electronically Signed by Bethanie Daniels MD    ______________________________________________________________________    Chief Complaint:   Chief Complaint   Patient presents with    Sleep Apnea        Patient ID: Cesia Coburn is a 45 y o  y o  male has a past medical history of AYLA treated with BiPAP     5/2/2022  Patient presents today for initial visit  Cesia Coburn is a very pleasant 50-year-old gentleman who works as a , states he works 7:00 a m  to 5:00 p m  but sometimes does have extended hours has to work for about 12 hours shifts sometimes, only works days, has been diagnosed with obstructive sleep apnea several years ago he things it might be severe sleep apnea then and has been on BiPAP in 2019 had a split night study done and got a new BiPAP machine then  And has been placed on 12/7 and continues to you use that consistently  He states he goes to bed at around 9:30 p m  falls asleep right away and is out of bed at 5:30 a m  has 1 nocturnal awakening for nocturia, when he is out of the bed he still tired and fatigued and usually does not take a nap during the daytime scheduled naps, but when he sits down in front of the TV or reading a book a does doze off ,       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   Travel history:  None  Review of Systems   Constitutional: Positive for fatigue  HENT: Negative  Eyes: Negative  Respiratory: Negative  Cardiovascular: Negative  Gastrointestinal: Negative  Endocrine: Negative  Genitourinary: Negative  Musculoskeletal: Negative  Allergic/Immunologic: Negative  Neurological: Negative  Hematological: Negative  Psychiatric/Behavioral: Negative  Social history: He reports that he has been smoking cigars  He has a 0 25 pack-year smoking history  He uses smokeless tobacco  He reports current alcohol use of about 2 0 standard drinks of alcohol per week  He reports that he does not use drugs      Past surgical history:   Past Surgical History: Procedure Laterality Date    VASECTOMY       Family history: History reviewed  No pertinent family history      Immunization History   Administered Date(s) Administered    COVID-19 MODERNA VACC 0 5 ML IM 03/24/2021, 04/21/2021, 01/20/2022    Influenza, recombinant, quadrivalent,injectable, preservative free 11/23/2019     Current Outpatient Medications   Medication Sig Dispense Refill    Bacillus Coagulans-Inulin (Probiotic) 1-250 BILLION-MG CAPS Take by mouth      Continuous Blood Gluc Transmit (Guardian Connect Transmitter) MISC To change site every 7 days      gabapentin (NEURONTIN) 100 mg capsule Take 1 capsule (100 mg total) by mouth 2 (two) times a day 90 capsule 0    Glucose Blood (BL TEST STRIP PACK VI) 2 (two) times a day      insulin glargine (LANTUS) 100 units/mL subcutaneous injection Inject 25 Units under the skin daily at bedtime (Patient taking differently: Inject 20 Units under the skin daily at bedtime ) 10 mL 0    insulin lispro (HumaLOG) 100 units/mL injection Inject 1-6 Units under the skin daily at bedtime 3 mL 0    Magnesium 100 MG CAPS Take 500 mcg by mouth      metFORMIN (GLUCOPHAGE-XR) 750 mg 24 hr tablet       Multiple Vitamins-Minerals (MULTIVITAMIN ADULT EXTRA C PO) Take 1 tablet by mouth daily      multivitamin (THERAGRAN) TABS Take 1 tablet by mouth daily      clotrimazole (LOTRIMIN) 1 % cream Apply topically 2 (two) times a day (Patient not taking: Reported on 5/28/2020) 30 g 0    insulin lispro (HumaLOG) 100 units/mL injection Inject 8 Units under the skin 3 (three) times a day with meals (Patient not taking: Reported on 5/28/2020) 10 mL 0    pantoprazole (PROTONIX) 40 mg tablet Take 1 tablet (40 mg total) by mouth daily (Patient not taking: Reported on 5/28/2020) 30 tablet 0    rosuvastatin (CRESTOR) 10 MG tablet Take 10 mg by mouth daily      saccharomyces boulardii (FLORASTOR) 250 mg capsule Take 1 capsule (250 mg total) by mouth 2 (two) times a day 60 capsule 0 No current facility-administered medications for this visit  Allergies: Codeine    Objective:  Vitals:    05/02/22 1457   BP: 114/80   Pulse: 84   Temp: 97 6 °F (36 4 °C)   SpO2: 96%   Weight: 106 kg (234 lb)   Height: 6' 1" (1 854 m)   Oxygen Therapy  SpO2: 96 %    Wt Readings from Last 3 Encounters:   05/02/22 106 kg (234 lb)   02/24/20 108 kg (239 lb)   11/25/19 106 kg (233 lb 11 oz)     Body mass index is 30 87 kg/m²  Physical Exam  Vitals and nursing note reviewed  Constitutional:       Appearance: He is well-developed  HENT:      Head: Normocephalic and atraumatic  Eyes:      Conjunctiva/sclera: Conjunctivae normal       Pupils: Pupils are equal, round, and reactive to light  Neck:      Thyroid: No thyromegaly  Vascular: No JVD  Cardiovascular:      Rate and Rhythm: Normal rate and regular rhythm  Heart sounds: Normal heart sounds  No murmur heard  No friction rub  No gallop  Pulmonary:      Effort: Pulmonary effort is normal  No respiratory distress  Breath sounds: Normal breath sounds  No wheezing or rales  Chest:      Chest wall: No tenderness  Musculoskeletal:         General: No tenderness or deformity  Normal range of motion  Cervical back: Normal range of motion and neck supple  Lymphadenopathy:      Cervical: No cervical adenopathy  Skin:     General: Skin is warm and dry  Neurological:      Mental Status: He is alert and oriented to person, place, and time  Diagnostics:  I have personally reviewed pertinent reports      ESS: Total score: 13

## 2022-08-05 ENCOUNTER — TELEPHONE (OUTPATIENT)
Dept: PULMONOLOGY | Facility: CLINIC | Age: 52
End: 2022-08-05

## 2022-10-17 ENCOUNTER — CONSULT (OUTPATIENT)
Dept: PULMONOLOGY | Facility: CLINIC | Age: 52
End: 2022-10-17
Payer: COMMERCIAL

## 2022-10-17 VITALS
BODY MASS INDEX: 31.94 KG/M2 | HEIGHT: 73 IN | DIASTOLIC BLOOD PRESSURE: 84 MMHG | WEIGHT: 241 LBS | SYSTOLIC BLOOD PRESSURE: 124 MMHG | HEART RATE: 68 BPM | OXYGEN SATURATION: 97 % | TEMPERATURE: 97.7 F

## 2022-10-17 DIAGNOSIS — E66.9 OBESITY (BMI 30-39.9): ICD-10-CM

## 2022-10-17 DIAGNOSIS — G47.33 OSA TREATED WITH BIPAP: Primary | ICD-10-CM

## 2022-10-17 DIAGNOSIS — F17.211 CIGARETTE NICOTINE DEPENDENCE IN REMISSION: ICD-10-CM

## 2022-10-17 PROCEDURE — 99214 OFFICE O/P EST MOD 30 MIN: CPT | Performed by: INTERNAL MEDICINE

## 2022-10-17 RX ORDER — ATORVASTATIN CALCIUM 20 MG/1
20 TABLET, FILM COATED ORAL DAILY
COMMUNITY
Start: 2022-03-14 | End: 2023-03-14

## 2022-10-17 RX ORDER — LISINOPRIL 2.5 MG/1
2.5 TABLET ORAL DAILY
COMMUNITY

## 2022-10-17 RX ORDER — PERPHENAZINE 16 MG
TABLET ORAL
COMMUNITY

## 2022-10-17 NOTE — PROGRESS NOTES
Assessment/Plan:   Diagnoses and all orders for this visit:    AYLA treated with BiPAP    Cigarette nicotine dependence in remission  -     CT lung screening program; Future    Obesity (BMI 30-39  9)    Other orders  -     Alpha-Lipoic Acid 600 MG CAPS; Take by mouth  -     atorvastatin (LIPITOR) 20 mg tablet; Take 20 mg by mouth daily  -     Cholecalciferol 25 MCG (1000 UT) capsule; Take 1,000 Units by mouth daily  -     lisinopril (ZESTRIL) 2 5 mg tablet; Take 2 5 mg by mouth daily        Patient with obstructive sleep apnea being treated with BiPAP currently on the settings of 12/7 consistently using it  Download compliance for the past 3 months reviewed with 97 8% compliance, acceptable residual AHI of 1 8   Cleaning of the supplies and change of CPAP supplies discussed   Recommend weight loss  Today his Kiana sleepiness score is less than 10 , does not feel too much excessively sleepy during the daytime will hold off on the multiple sleep latency testing/CPAP titration   Cautioned against driving when sleepy  He wants a travel CPAP he is going to call his [employer] company and ask if there is the travels Z with the BiPAP settings that he can use or he will look online he states to see which model he wants and he will let us know so that I can order it and he will buy it on his own he states  Also a candidate for lung cancer screening which I have ordered the CT lung screening   Will give him a call with the results of the testing  Will see him back in 6 months  or p r n  earlier as needed  No follow-ups on file  All questions are answered to the patient's satisfaction and understanding  He verbalizes understanding  He is encouraged to call with any further questions or concerns  Portions of the record may have been created with voice recognition software  Occasional wrong word or "sound a like" substitutions may have occurred due to the inherent limitations of voice recognition software    Read the chart carefully and recognize, using context, where substitutions have occurred  Electronically Signed by Stuart Pantoja MD    ______________________________________________________________________    Chief Complaint:   Chief Complaint   Patient presents with   • Sleep Apnea       Patient ID: Ranjit Myrick is a 47 y o  y o  male has a past medical history of AYLA treated with BiPAP  10/17/2022  Patient presents today for follow-up visit  Patient is a very pleasant 23-year-old gentleman here for follow-up, he is being treated for his sleep apnea with a BiPAP works in [address] he states, not a  he works from 7:00 a m  to 5:00 p m  sometimes has extended hours to work, he has to travel at least once a week he states  He has been consistently using his the BiPAP machine and he states he is feeling well rested currently when he is up in the morning has decreased nocturnal awakenings  He wants to know if he can get a travel size machine           Pertinent negatives include no chest pain, fever, headaches, myalgias or sore throat  Review of Systems   Constitutional: Negative  Negative for fever  HENT: Negative  Negative for sore throat  Eyes: Negative  Respiratory: Negative  Cardiovascular: Negative  Negative for chest pain  Gastrointestinal: Negative  Endocrine: Negative  Genitourinary: Negative  Musculoskeletal: Negative  Negative for myalgias  Allergic/Immunologic: Negative  Neurological: Negative  Negative for headaches  Hematological: Negative  Psychiatric/Behavioral: Negative  Smoking history: He reports that he quit smoking about 7 years ago  His smoking use included cigars and cigarettes  He started smoking about 27 years ago  He has a 20 00 pack-year smoking history   He uses smokeless tobacco     The following portions of the patient's history were reviewed and updated as appropriate: allergies, current medications, past family history, past medical history, past social history, past surgical history and problem list     Immunization History   Administered Date(s) Administered   • COVID-19 MODERNA VACC 0 5 ML IM 03/24/2021, 04/21/2021, 01/20/2022   • Influenza, recombinant, quadrivalent,injectable, preservative free 11/23/2019     Current Outpatient Medications   Medication Sig Dispense Refill   • Alpha-Lipoic Acid 600 MG CAPS Take by mouth     • atorvastatin (LIPITOR) 20 mg tablet Take 20 mg by mouth daily     • Bacillus Coagulans-Inulin (Probiotic) 1-250 BILLION-MG CAPS Take by mouth     • Cholecalciferol 25 MCG (1000 UT) capsule Take 1,000 Units by mouth daily     • Continuous Blood Gluc Transmit (Guardian Connect Transmitter) MISC To change site every 7 days     • gabapentin (NEURONTIN) 100 mg capsule Take 1 capsule (100 mg total) by mouth 2 (two) times a day 90 capsule 0   • Glucose Blood (BL TEST STRIP PACK VI) 2 (two) times a day     • insulin glargine (LANTUS) 100 units/mL subcutaneous injection Inject 25 Units under the skin daily at bedtime (Patient taking differently: Inject 20 Units under the skin daily at bedtime) 10 mL 0   • insulin lispro (HumaLOG) 100 units/mL injection Inject 1-6 Units under the skin daily at bedtime 3 mL 0   • lisinopril (ZESTRIL) 2 5 mg tablet Take 2 5 mg by mouth daily     • Magnesium 100 MG CAPS Take 500 mcg by mouth     • metFORMIN (GLUCOPHAGE-XR) 750 mg 24 hr tablet      • Multiple Vitamins-Minerals (MULTIVITAMIN ADULT EXTRA C PO) Take 1 tablet by mouth daily     • multivitamin (THERAGRAN) TABS Take 1 tablet by mouth daily     • clotrimazole (LOTRIMIN) 1 % cream Apply topically 2 (two) times a day (Patient not taking: Reported on 5/28/2020) 30 g 0   • insulin lispro (HumaLOG) 100 units/mL injection Inject 8 Units under the skin 3 (three) times a day with meals (Patient not taking: No sig reported) 10 mL 0   • pantoprazole (PROTONIX) 40 mg tablet Take 1 tablet (40 mg total) by mouth daily (Patient not taking: Reported on 5/28/2020) 30 tablet 0   • rosuvastatin (CRESTOR) 10 MG tablet Take 10 mg by mouth daily     • saccharomyces boulardii (FLORASTOR) 250 mg capsule Take 1 capsule (250 mg total) by mouth 2 (two) times a day 60 capsule 0     No current facility-administered medications for this visit  Allergies: Codeine    Objective:  Vitals:    10/17/22 0854   BP: 124/84   Pulse: 68   Temp: 97 7 °F (36 5 °C)   SpO2: 97%   Weight: 109 kg (241 lb)   Height: 6' 1" (1 854 m)   Oxygen Therapy  SpO2: 97 %    Wt Readings from Last 3 Encounters:   10/17/22 109 kg (241 lb)   05/02/22 106 kg (234 lb)   02/24/20 108 kg (239 lb)     Body mass index is 31 8 kg/m²  Physical Exam  Constitutional:       Appearance: He is well-developed  HENT:      Head: Normocephalic and atraumatic  Eyes:      Pupils: Pupils are equal, round, and reactive to light  Neck:      Comments: Short and wide neck  Cardiovascular:      Rate and Rhythm: Normal rate and regular rhythm  Heart sounds: Normal heart sounds  Pulmonary:      Effort: Pulmonary effort is normal       Breath sounds: Normal breath sounds  Musculoskeletal:         General: Normal range of motion  Cervical back: Normal range of motion and neck supple  Skin:     General: Skin is warm and dry  Neurological:      Mental Status: He is alert and oriented to person, place, and time  Psychiatric:         Behavior: Behavior normal              Diagnostics:  I have personally reviewed pertinent reports      ESS: Total score: 10    Answers for HPI/ROS submitted by the patient on 10/17/2022  Chronicity: chronic  When did you first notice your symptoms?: more than 1 year ago  How often do your symptoms occur?: daily  Since you first noticed this problem, how has it changed?: rapidly improving  Do you have shortness of breath that occurs with effort or exertion?: No  Do you have ear congestion?: No  Do you have heartburn?: No  Do you have fatigue?: No  Do you have nasal congestion?: No  Do you have shortness of breath when lying flat?: No  Do you have shortness of breath when you wake up?: No  Do you have sweats?: No  Have you experienced weight loss?: No  Which of the following makes your symptoms worse?: nothing

## 2022-12-02 ENCOUNTER — HOSPITAL ENCOUNTER (OUTPATIENT)
Dept: CT IMAGING | Facility: HOSPITAL | Age: 52
End: 2022-12-02
Attending: INTERNAL MEDICINE

## 2022-12-02 DIAGNOSIS — F17.211 CIGARETTE NICOTINE DEPENDENCE IN REMISSION: ICD-10-CM

## 2022-12-06 ENCOUNTER — TELEPHONE (OUTPATIENT)
Dept: PULMONOLOGY | Facility: CLINIC | Age: 52
End: 2022-12-06

## 2022-12-06 NOTE — TELEPHONE ENCOUNTER
----- Message from Jeri Portillo MD sent at 12/6/2022  4:52 PM EST -----  Please let him know the CT lung screening is good we will need to repeat it in 1 year follow-up in the office as scheduled

## 2023-10-16 ENCOUNTER — TELEPHONE (OUTPATIENT)
Age: 53
End: 2023-10-16

## 2023-10-16 ENCOUNTER — OFFICE VISIT (OUTPATIENT)
Age: 53
End: 2023-10-16
Payer: COMMERCIAL

## 2023-10-16 VITALS
WEIGHT: 242 LBS | DIASTOLIC BLOOD PRESSURE: 76 MMHG | OXYGEN SATURATION: 98 % | HEIGHT: 72 IN | BODY MASS INDEX: 32.78 KG/M2 | RESPIRATION RATE: 18 BRPM | HEART RATE: 76 BPM | SYSTOLIC BLOOD PRESSURE: 129 MMHG

## 2023-10-16 DIAGNOSIS — G47.33 OSA TREATED WITH BIPAP: Primary | ICD-10-CM

## 2023-10-16 DIAGNOSIS — F17.211 CIGARETTE NICOTINE DEPENDENCE IN REMISSION: ICD-10-CM

## 2023-10-16 DIAGNOSIS — E66.9 OBESITY (BMI 30-39.9): ICD-10-CM

## 2023-10-16 PROCEDURE — 99214 OFFICE O/P EST MOD 30 MIN: CPT | Performed by: INTERNAL MEDICINE

## 2023-10-16 NOTE — PROGRESS NOTES
Assessment/Plan:   Diagnoses and all orders for this visit:    AYLA treated with BiPAP  -     PAP DME Resupply/Reorder    Cigarette nicotine dependence in remission    Obesity (BMI 30-39. 9)    History of AYLA on BiPAP 12/7 cm of water consistently using it has decreased nocturnal awakenings feels well rested when he is up in the morning. Reviewed compliance data over the phone with 100% compliance, he does travel a lot still carries his PAP machine he states. No evidence of any mask leak acceptable residual AHI on an average between 2-3. He is also looking for a travel size machine discussed he will look for the week and more he will let us know and we will send in the prescription for the settings as well  Cleaning of the supplies and change of PAP supplies discussed  Recommend weight loss  Caution against driving when sleepy. Follow-up in 1 year or as needed earlier as needed  Has very little smoking history quit several years ago he states he only smokes cigars and on not cigarettes. Cigar smoking cessation discussed not a candidate for lung cancer screening  Follow-up in 1 year up earlier as needed    No follow-ups on file. All questions are answered to the patient's satisfaction and understanding. He verbalizes understanding. He is encouraged to call with any further questions or concerns. Portions of the record may have been created with voice recognition software. Occasional wrong word or "sound a like" substitutions may have occurred due to the inherent limitations of voice recognition software. Read the chart carefully and recognize, using context, where substitutions have occurred.     Electronically Signed by Josh Keller MD    ______________________________________________________________________    Chief Complaint:   Chief Complaint   Patient presents with    Follow-up       Patient ID: Mahin Burks is a 46 y.o. y.o. male has a past medical history of Diabetes mellitus ([address]) (11/2019) and AYLA treated with BiPAP. 10/16/2023  Patient presents today for follow-up visit. Patient with history of obstructive sleep apnea on BiPAP consistently using it. Here for follow-up      primary symptoms  Pertinent negatives include no chest pain, fatigue, fever, headaches, myalgias or sore throat. Review of Systems   Constitutional: Negative. Negative for appetite change, fatigue and fever. HENT: Negative. Negative for ear pain, postnasal drip, rhinorrhea, sneezing, sore throat and trouble swallowing. Eyes: Negative. Respiratory: Negative. Cardiovascular: Negative. Negative for chest pain. Gastrointestinal: Negative. Endocrine: Negative. Genitourinary: Negative. Musculoskeletal: Negative. Negative for myalgias. Allergic/Immunologic: Negative. Neurological: Negative. Negative for headaches. Hematological: Negative. Psychiatric/Behavioral: Negative. Smoking history: He reports that he is a non-smoker but has been exposed to tobacco smoke. He quit smokeless tobacco use about 24 years ago. His smokeless tobacco use included chew.     The following portions of the patient's history were reviewed and updated as appropriate: allergies, current medications, past family history, past medical history, past social history, past surgical history, and problem list.    Immunization History   Administered Date(s) Administered    COVID-19 MODERNA VACC 0.5 ML IM 03/24/2021, 04/21/2021, 01/20/2022    Influenza, recombinant, quadrivalent,injectable, preservative free 11/23/2019     Current Outpatient Medications   Medication Sig Dispense Refill    Alpha-Lipoic Acid 600 MG CAPS Take by mouth      Bacillus Coagulans-Inulin (Probiotic) 1-250 BILLION-MG CAPS Take by mouth      Cholecalciferol 25 MCG (1000 UT) capsule Take 1,000 Units by mouth daily      Continuous Blood Gluc Transmit (Guardian Connect Transmitter) MISC To change site every 7 days      gabapentin (NEURONTIN) 100 mg capsule Take 1 capsule (100 mg total) by mouth 2 (two) times a day 90 capsule 0    Glucose Blood (BL TEST STRIP PACK VI) 2 (two) times a day      insulin glargine (LANTUS) 100 units/mL subcutaneous injection Inject 25 Units under the skin daily at bedtime (Patient taking differently: Inject 20 Units under the skin daily at bedtime) 10 mL 0    insulin lispro (HumaLOG) 100 units/mL injection Inject 1-6 Units under the skin daily at bedtime 3 mL 0    lisinopril (ZESTRIL) 2.5 mg tablet Take 2.5 mg by mouth daily      Magnesium 100 MG CAPS Take 500 mcg by mouth      metFORMIN (GLUCOPHAGE-XR) 750 mg 24 hr tablet       Multiple Vitamins-Minerals (MULTIVITAMIN ADULT EXTRA C PO) Take 1 tablet by mouth daily      multivitamin (THERAGRAN) TABS Take 1 tablet by mouth daily      atorvastatin (LIPITOR) 20 mg tablet Take 20 mg by mouth daily      clotrimazole (LOTRIMIN) 1 % cream Apply topically 2 (two) times a day (Patient not taking: Reported on 5/28/2020) 30 g 0    insulin lispro (HumaLOG) 100 units/mL injection Inject 8 Units under the skin 3 (three) times a day with meals (Patient not taking: Reported on 5/28/2020) 10 mL 0    pantoprazole (PROTONIX) 40 mg tablet Take 1 tablet (40 mg total) by mouth daily (Patient not taking: Reported on 5/28/2020) 30 tablet 0    rosuvastatin (CRESTOR) 10 MG tablet Take 10 mg by mouth daily      saccharomyces boulardii (FLORASTOR) 250 mg capsule Take 1 capsule (250 mg total) by mouth 2 (two) times a day 60 capsule 0     No current facility-administered medications for this visit. Allergies: Codeine and Empagliflozin    Objective:  Vitals:    10/16/23 0816 10/16/23 0818   BP: 129/76    BP Location: Left arm    Patient Position: Sitting    Cuff Size: Large    Pulse: 76    Resp: 18    SpO2: 98% 98%   Weight: 110 kg (242 lb)    Height: 6' (1.829 m)    Oxygen Therapy  SpO2: 98 %  Oxygen Therapy: None (Room air)  .   Wt Readings from Last 3 Encounters:   10/16/23 110 kg (242 lb)   10/17/22 109 kg (241 lb)   05/02/22 106 kg (234 lb)     Body mass index is 32.82 kg/m². Physical Exam  Constitutional:       Appearance: He is well-developed. HENT:      Head: Normocephalic and atraumatic. Eyes:      Pupils: Pupils are equal, round, and reactive to light. Neck:      Comments: Short and wide neck  Cardiovascular:      Rate and Rhythm: Normal rate and regular rhythm. Heart sounds: Normal heart sounds. Pulmonary:      Effort: Pulmonary effort is normal.      Breath sounds: Normal breath sounds. Musculoskeletal:         General: Normal range of motion. Cervical back: Normal range of motion and neck supple. Skin:     General: Skin is warm and dry. Neurological:      Mental Status: He is alert and oriented to person, place, and time. Psychiatric:         Behavior: Behavior normal.         Lab Review:   No visits with results within 6 Month(s) from this visit.    Latest known visit with results is:   Admission on 11/23/2019, Discharged on 11/28/2019   Component Date Value    WBC 11/23/2019 20.15 (H)     RBC 11/23/2019 5.34     Hemoglobin 11/23/2019 16.6     Hematocrit 11/23/2019 47.1     MCV 11/23/2019 88     MCH 11/23/2019 31.1     MCHC 11/23/2019 35.2     RDW 11/23/2019 12.4     MPV 11/23/2019 9.8     Platelets 63/36/8001 200     nRBC 11/23/2019 0     Neutrophils Relative 11/23/2019 89 (H)     Immat GRANS % 11/23/2019 0     Lymphocytes Relative 11/23/2019 5 (L)     Monocytes Relative 11/23/2019 6     Eosinophils Relative 11/23/2019 0     Basophils Relative 11/23/2019 0     Neutrophils Absolute 11/23/2019 17.85 (H)     Immature Grans Absolute 11/23/2019 0.08     Lymphocytes Absolute 11/23/2019 0.97     Monocytes Absolute 11/23/2019 1.19     Eosinophils Absolute 11/23/2019 0.03     Basophils Absolute 11/23/2019 0.03     Sodium 11/23/2019 136     Potassium 11/23/2019 3.8     Chloride 11/23/2019 98 (L)     CO2 11/23/2019 24     ANION GAP 11/23/2019 14 (H)     BUN 11/23/2019 13     Creatinine 11/23/2019 1.06     Glucose 11/23/2019 312 (H)     Calcium 11/23/2019 9.1     AST 11/23/2019 49 (H)     ALT 11/23/2019 108 (H)     Alkaline Phosphatase 11/23/2019 86     Total Protein 11/23/2019 7.2     Albumin 11/23/2019 4.1     Total Bilirubin 11/23/2019 0.80     eGFR 11/23/2019 81     LACTIC ACID 11/23/2019 3.1 (HH)     Protime 11/23/2019 13.3     INR 11/23/2019 1.01     PTT 11/23/2019 28     Lipase 11/23/2019 326     Color, UA 11/23/2019 Yellow     Clarity, UA 11/23/2019 Clear     Specific Gravity, UA 11/23/2019 1.025     pH, UA 11/23/2019 5.0     Leukocytes, UA 11/23/2019 Elevated glucose may cause decreased leukocyte values. See urine microscopic for [facility] result/ (A)     Nitrite, UA 11/23/2019 Negative     Protein, UA 11/23/2019 Negative     Glucose, UA 11/23/2019 >=1000 (1%) (A)     Ketones, UA 11/23/2019 Negative     Urobilinogen, UA 11/23/2019 0.2     Bilirubin, UA 11/23/2019 Negative     Occult Blood, UA 11/23/2019 Negative     LACTIC ACID 11/23/2019 2.0     Blood Culture 11/23/2019 No Growth After 5 Days. Blood Culture 11/23/2019 No Growth After 5 Days.      RBC, UA 11/23/2019 1-2 (A)     WBC, UA 11/23/2019 2-4 (A)     Epithelial Cells 11/23/2019 None Seen     Bacteria, UA 11/23/2019 None Seen     Hemoglobin A1C 11/23/2019 11.9 (H)     EAG 11/23/2019 295     Platelets 48/08/7637 204     MPV 11/23/2019 9.7     POC Glucose 11/23/2019 333 (H)     WBC 11/24/2019 12.91 (H)     RBC 11/24/2019 4.85     Hemoglobin 11/24/2019 15.0     Hematocrit 11/24/2019 43.6     MCV 11/24/2019 90     MCH 11/24/2019 30.9     MCHC 11/24/2019 34.4     RDW 11/24/2019 12.4     Platelets 34/25/3805 188     MPV 11/24/2019 10.0     Sodium 11/24/2019 136     Potassium 11/24/2019 3.7     Chloride 11/24/2019 101     CO2 11/24/2019 22     ANION GAP 11/24/2019 13     BUN 11/24/2019 13     Creatinine 11/24/2019 1.02     Glucose 11/24/2019 288 (H)     Calcium 11/24/2019 8.5     AST 11/24/2019 25     ALT 11/24/2019 73     Alkaline Phosphatase 11/24/2019 69     Total Protein 11/24/2019 6.3 (L)     Albumin 11/24/2019 3.3 (L)     Total Bilirubin 11/24/2019 1.20 (H)     eGFR 11/24/2019 85     POC Glucose 11/24/2019 292 (H)     POC Glucose 11/24/2019 274 (H)     POC Glucose 11/24/2019 250 (H)     POC Glucose 11/24/2019 249 (H)     Sodium 11/25/2019 141     Potassium 11/25/2019 3.4 (L)     Chloride 11/25/2019 109 (H)     CO2 11/25/2019 22     ANION GAP 11/25/2019 10     BUN 11/25/2019 9     Creatinine 11/25/2019 0.76     Glucose 11/25/2019 224 (H)     Calcium 11/25/2019 7.6 (L)     AST 11/25/2019 14     ALT 11/25/2019 48     Alkaline Phosphatase 11/25/2019 51     Total Protein 11/25/2019 5.1 (L)     Albumin 11/25/2019 2.6 (L)     Total Bilirubin 11/25/2019 0.40     eGFR 11/25/2019 106     WBC 11/25/2019 9.00     RBC 11/25/2019 4.32     Hemoglobin 11/25/2019 13.4     Hematocrit 11/25/2019 39.2     MCV 11/25/2019 91     MCH 11/25/2019 31.0     MCHC 11/25/2019 34.2     RDW 11/25/2019 12.5     Platelets 91/36/6983 164     MPV 11/25/2019 9.8     POC Glucose 11/25/2019 225 (H)     POC Glucose 11/25/2019 254 (H)     Hemoglobin 11/25/2019 15.3     Hematocrit 11/25/2019 44.3     POC Glucose 11/25/2019 166 (H)     POC Glucose 11/25/2019 166 (H)     Sodium 11/26/2019 142     Potassium 11/26/2019 3.7     Chloride 11/26/2019 105     CO2 11/26/2019 26     ANION GAP 11/26/2019 11     BUN 11/26/2019 8     Creatinine 11/26/2019 0.93     Glucose 11/26/2019 219 (H)     Calcium 11/26/2019 8.8     eGFR 11/26/2019 95     WBC 11/26/2019 9.43     RBC 11/26/2019 4.73     Hemoglobin 11/26/2019 14.7     Hematocrit 11/26/2019 42.3     MCV 11/26/2019 89     MCH 11/26/2019 31.1     MCHC 11/26/2019 34.8     RDW 11/26/2019 12.4     Platelets 09/98/6555 203     MPV 11/26/2019 9.6     POC Glucose 11/26/2019 214 (H)     POC Glucose 11/26/2019 129     POC Glucose 11/26/2019 151 (H)     POC Glucose 11/26/2019 179 (H)     Sodium 11/27/2019 142     Potassium 11/27/2019 3.5     Chloride 11/27/2019 103     CO2 11/27/2019 30     ANION GAP 11/27/2019 9     BUN 11/27/2019 8     Creatinine 11/27/2019 0.97     Glucose 11/27/2019 185 (H)     Calcium 11/27/2019 9.1     eGFR 11/27/2019 91     WBC 11/27/2019 7.31     RBC 11/27/2019 5.05     Hemoglobin 11/27/2019 15.9     Hematocrit 11/27/2019 45.1     MCV 11/27/2019 89     MCH 11/27/2019 31.5     MCHC 11/27/2019 35.3     RDW 11/27/2019 12.5     Platelets 96/69/0580 251     MPV 11/27/2019 9.2     POC Glucose 11/27/2019 164 (H)     POC Glucose 11/27/2019 199 (H)     POC Glucose 11/27/2019 142 (H)     POC Glucose 11/27/2019 173 (H)     Sodium 11/28/2019 142     Potassium 11/28/2019 3.8     Chloride 11/28/2019 105     CO2 11/28/2019 31     ANION GAP 11/28/2019 6     BUN 11/28/2019 8     Creatinine 11/28/2019 0.98     Glucose 11/28/2019 151 (H)     Calcium 11/28/2019 9.0     eGFR 11/28/2019 90     WBC 11/28/2019 7.73     RBC 11/28/2019 4.96     Hemoglobin 11/28/2019 15.0     Hematocrit 11/28/2019 44.4     MCV 11/28/2019 90     MCH 11/28/2019 30.2     MCHC 11/28/2019 33.8     RDW 11/28/2019 12.5     Platelets 53/15/5085 274     MPV 11/28/2019 9.2     POC Glucose 11/28/2019 143 (H)        Diagnostics:  I have personally reviewed pertinent reports.     ESS: Total score: 9  DIABETIC RETINOPATHY SCREENING    Result Date: 10/7/2023  Answers submitted by the patient for this visit:  Pulmonology Questionnaire (Submitted on 10/15/2023)  Chief Complaint: Primary symptoms  Chronicity: recurrent  When did you first notice your symptoms?: more than 1 year ago  How often do your symptoms occur?: daily  Since you first noticed this problem, how has it changed?: unchanged  Do you have shortness of breath that occurs with effort or exertion?: No  Do you have ear congestion?: No  Do you have heartburn?: No  Do you have fatigue?: No  Do you have nasal congestion?: No  Do you have shortness of breath when lying flat?: No  Do you have shortness of breath when you wake up?: No  Do you have sweats?: No  Have you experienced weight loss?: No  Which of the following makes your symptoms worse?: nothing  Which of the following makes your symptoms better?: nothing  Risk factors for lung disease: animal exposure, occupational exposure, smoking/tobacco exposure, travel

## 2023-10-17 LAB

## 2024-02-21 PROBLEM — R65.20 SEVERE SEPSIS (HCC): Status: RESOLVED | Noted: 2019-11-23 | Resolved: 2024-02-21

## 2024-02-21 PROBLEM — K52.9 GASTROENTERITIS: Status: RESOLVED | Noted: 2019-11-23 | Resolved: 2024-02-21

## 2024-02-21 PROBLEM — A41.9 SEVERE SEPSIS (HCC): Status: RESOLVED | Noted: 2019-11-23 | Resolved: 2024-02-21

## 2024-06-19 ENCOUNTER — TELEPHONE (OUTPATIENT)
Age: 54
End: 2024-06-19

## 2024-11-07 DIAGNOSIS — Z00.6 ENCOUNTER FOR EXAMINATION FOR NORMAL COMPARISON OR CONTROL IN CLINICAL RESEARCH PROGRAM: ICD-10-CM
